# Patient Record
Sex: MALE | Race: WHITE | NOT HISPANIC OR LATINO | Employment: FULL TIME | URBAN - METROPOLITAN AREA
[De-identification: names, ages, dates, MRNs, and addresses within clinical notes are randomized per-mention and may not be internally consistent; named-entity substitution may affect disease eponyms.]

---

## 2017-05-05 ENCOUNTER — ALLSCRIPTS OFFICE VISIT (OUTPATIENT)
Dept: OTHER | Facility: OTHER | Age: 37
End: 2017-05-05

## 2017-06-14 ENCOUNTER — TRANSCRIBE ORDERS (OUTPATIENT)
Dept: ADMINISTRATIVE | Age: 37
End: 2017-06-14

## 2017-06-14 ENCOUNTER — APPOINTMENT (OUTPATIENT)
Dept: RADIOLOGY | Age: 37
End: 2017-06-14
Attending: PREVENTIVE MEDICINE

## 2017-06-14 ENCOUNTER — APPOINTMENT (OUTPATIENT)
Dept: LAB | Age: 37
End: 2017-06-14
Attending: PREVENTIVE MEDICINE

## 2017-06-14 DIAGNOSIS — Z02.1 PRE-EMPLOYMENT HEALTH SCREENING EXAMINATION: ICD-10-CM

## 2017-06-14 DIAGNOSIS — Z02.1 PRE-EMPLOYMENT HEALTH SCREENING EXAMINATION: Primary | ICD-10-CM

## 2017-06-14 LAB
ERYTHROCYTE [DISTWIDTH] IN BLOOD BY AUTOMATED COUNT: 12.3 % (ref 11.6–15.1)
HCT VFR BLD AUTO: 40.5 % (ref 36.5–49.3)
HGB BLD-MCNC: 13.6 G/DL (ref 12–17)
MCH RBC QN AUTO: 31.8 PG (ref 26.8–34.3)
MCHC RBC AUTO-ENTMCNC: 33.6 G/DL (ref 31.4–37.4)
MCV RBC AUTO: 95 FL (ref 82–98)
PLATELET # BLD AUTO: 238 THOUSANDS/UL (ref 149–390)
PMV BLD AUTO: 10.6 FL (ref 8.9–12.7)
RBC # BLD AUTO: 4.28 MILLION/UL (ref 3.88–5.62)
WBC # BLD AUTO: 4.33 THOUSAND/UL (ref 4.31–10.16)

## 2017-06-14 PROCEDURE — 71010 HB CHEST X-RAY 1 VIEW FRONTAL: CPT

## 2017-06-14 PROCEDURE — 85027 COMPLETE CBC AUTOMATED: CPT

## 2017-06-14 PROCEDURE — 36415 COLL VENOUS BLD VENIPUNCTURE: CPT

## 2018-01-13 VITALS
SYSTOLIC BLOOD PRESSURE: 106 MMHG | HEART RATE: 72 BPM | DIASTOLIC BLOOD PRESSURE: 74 MMHG | RESPIRATION RATE: 16 BRPM | HEIGHT: 73 IN | BODY MASS INDEX: 27.04 KG/M2 | WEIGHT: 204 LBS | TEMPERATURE: 95.7 F

## 2018-02-27 ENCOUNTER — TELEPHONE (OUTPATIENT)
Dept: FAMILY MEDICINE CLINIC | Facility: CLINIC | Age: 38
End: 2018-02-27

## 2018-02-28 ENCOUNTER — OFFICE VISIT (OUTPATIENT)
Dept: FAMILY MEDICINE CLINIC | Facility: CLINIC | Age: 38
End: 2018-02-28
Payer: COMMERCIAL

## 2018-02-28 VITALS
WEIGHT: 195.8 LBS | DIASTOLIC BLOOD PRESSURE: 68 MMHG | TEMPERATURE: 97.4 F | BODY MASS INDEX: 25.95 KG/M2 | SYSTOLIC BLOOD PRESSURE: 108 MMHG | HEART RATE: 74 BPM | HEIGHT: 73 IN | RESPIRATION RATE: 18 BRPM

## 2018-02-28 DIAGNOSIS — G56.23 CUBITAL TUNNEL SYNDROME, BILATERAL: ICD-10-CM

## 2018-02-28 DIAGNOSIS — F90.0 ATTENTION DEFICIT HYPERACTIVITY DISORDER (ADHD), PREDOMINANTLY INATTENTIVE TYPE: ICD-10-CM

## 2018-02-28 DIAGNOSIS — A60.00 RECURRENT GENITAL HERPES: ICD-10-CM

## 2018-02-28 DIAGNOSIS — M77.01 MEDIAL EPICONDYLITIS OF RIGHT ELBOW: ICD-10-CM

## 2018-02-28 DIAGNOSIS — M77.11 RIGHT LATERAL EPICONDYLITIS: Primary | ICD-10-CM

## 2018-02-28 PROCEDURE — 99213 OFFICE O/P EST LOW 20 MIN: CPT | Performed by: FAMILY MEDICINE

## 2018-02-28 RX ORDER — VALACYCLOVIR HYDROCHLORIDE 1 G/1
TABLET, FILM COATED ORAL
Qty: 20 TABLET | Refills: 5 | Status: SHIPPED | OUTPATIENT
Start: 2018-02-28 | End: 2018-05-21 | Stop reason: SDUPTHER

## 2018-02-28 RX ORDER — METHYLPHENIDATE HYDROCHLORIDE 36 MG/1
36 TABLET ORAL EVERY MORNING
Qty: 30 TABLET | Refills: 0 | Status: SHIPPED | OUTPATIENT
Start: 2018-02-28 | End: 2018-05-21 | Stop reason: SDUPTHER

## 2018-02-28 RX ORDER — NAPROXEN 500 MG/1
500 TABLET ORAL 2 TIMES DAILY WITH MEALS
Qty: 60 TABLET | Refills: 1 | Status: SHIPPED | OUTPATIENT
Start: 2018-02-28 | End: 2022-01-28 | Stop reason: ALTCHOICE

## 2018-02-28 RX ORDER — METHYLPHENIDATE HYDROCHLORIDE 36 MG/1
1 TABLET, EXTENDED RELEASE ORAL DAILY
COMMUNITY
Start: 2011-04-04 | End: 2018-02-28 | Stop reason: SDUPTHER

## 2018-02-28 RX ORDER — VALACYCLOVIR HYDROCHLORIDE 1 G/1
TABLET, FILM COATED ORAL
COMMUNITY
Start: 2012-07-10 | End: 2018-02-28 | Stop reason: SDUPTHER

## 2018-02-28 NOTE — PROGRESS NOTES
Assessment/Plan:     Diagnoses and all orders for this visit:    Right lateral epicondylitis  Comments:  Reviewed: REC; rest, ice, brace  Avoid exacerbating positions/activities  Naproxen as directed  Recheck 2 weeks if no change  Orders:  -     naproxen (NAPROSYN) 500 mg tablet; Take 1 tablet (500 mg total) by mouth 2 (two) times a day with meals    Medial epicondylitis of right elbow  Comments:  as above  Orders:  -     naproxen (NAPROSYN) 500 mg tablet; Take 1 tablet (500 mg total) by mouth 2 (two) times a day with meals    Cubital tunnel syndrome, bilateral  Comments:  as above  Orders:  -     naproxen (NAPROSYN) 500 mg tablet; Take 1 tablet (500 mg total) by mouth 2 (two) times a day with meals    Attention deficit hyperactivity disorder (ADHD), predominantly inattentive type  Comments:  refilled meds  Orders:  -     methylphenidate (CONCERTA) 36 MG ER tablet; Take 1 tablet (36 mg total) by mouth every morning Max Daily Amount: 36 mg    Recurrent genital herpes  Comments:  refilled meds  Orders:  -     valACYclovir (VALTREX) 1,000 mg tablet; 1 tab po bid x 10 days    Other orders  -     Discontinue: Methylphenidate HCl ER 36 MG TB24; Take 1 tablet by mouth daily  -     Discontinue: valACYclovir (VALTREX) 1,000 mg tablet; Take by mouth          Subjective:      Patient ID: Terrence Griggs is a 40 y o  male  Pt with 2 week hx of worsening R>> L elbow pain  Pt notes some intermittent pain worse with certain positions and activities that had been going on for a few months but now has worsening pain that does not seem to be resolving  Pain Is over the medial and lateral epicondyles and may radiate to forearms  Can get numbness in the R 4th/5th fingers and lateral hand  No weakness  No neck pain or shoulder pain        The following portions of the patient's history were reviewed and updated as appropriate:   He  has no past medical history on file    He   Patient Active Problem List    Diagnosis Date Noted    Severe carpal tunnel syndrome, left 09/26/2016    Bilateral carpal tunnel syndrome 12/28/2015    Attention deficit hyperactivity disorder (ADHD) 09/24/2012     He  has a past surgical history that includes pr revise median n/carpal tunnel surg (Left, 10/25/2016) and pr revise median n/carpal tunnel surg (Right, 3/22/2016)  He  reports that he has quit smoking  He does not have any smokeless tobacco history on file  He reports that he drinks about 1 8 oz of alcohol per week   He reports that he does not use drugs  Current Outpatient Prescriptions   Medication Sig Dispense Refill    methylphenidate (CONCERTA) 36 MG ER tablet Take 1 tablet (36 mg total) by mouth every morning Max Daily Amount: 36 mg 30 tablet 0    valACYclovir (VALTREX) 1,000 mg tablet 1 tab po bid x 10 days 20 tablet 5    naproxen (NAPROSYN) 500 mg tablet Take 1 tablet (500 mg total) by mouth 2 (two) times a day with meals 60 tablet 1     No current facility-administered medications for this visit  He has No Known Allergies       Review of Systems   Musculoskeletal: Positive for arthralgias and myalgias  Negative for back pain, gait problem, joint swelling, neck pain and neck stiffness  Skin: Negative  Neurological: Negative for tremors, weakness and numbness  Objective:      /68   Pulse 74   Temp (!) 97 4 °F (36 3 °C)   Resp 18   Ht 6' 1" (1 854 m)   Wt 88 8 kg (195 lb 12 8 oz)   BMI 25 83 kg/m²          Physical Exam   Constitutional: He appears well-developed and well-nourished  Neck: Normal range of motion  Neck supple  Cardiovascular: Normal rate and intact distal pulses      Musculoskeletal:        Arms:

## 2018-03-01 ENCOUNTER — TELEPHONE (OUTPATIENT)
Dept: FAMILY MEDICINE CLINIC | Facility: CLINIC | Age: 38
End: 2018-03-01

## 2018-03-01 NOTE — LETTER
March 1, 2018     Patient: Kelsi Pappas   YOB: 1980   Date of Visit: 02/28/2018       To Whom It May Concern: It is my medical opinion that Kelsi Pappas may return to light duty immediately with the following restrictions: to avoid repetitive motion and lifting > 10 lbs with Right arm for 2 weeks       If you have any questions or concerns, please don't hesitate to call           Sincerely,        Mejia Cutler MD    CC: No Recipients

## 2018-03-01 NOTE — TELEPHONE ENCOUNTER
WAS SEEN YESTERDAY FOR HIS ELBOW     NEEDS A NOTE FOR WORK   FOR LIGHT DUTY FOR A FEW WEEKS     OR THAT HE HAS TO TAKE IT EASY          PLEASE CALL WHEN DONE

## 2018-03-30 ENCOUNTER — OFFICE VISIT (OUTPATIENT)
Dept: FAMILY MEDICINE CLINIC | Facility: CLINIC | Age: 38
End: 2018-03-30
Payer: COMMERCIAL

## 2018-03-30 VITALS
SYSTOLIC BLOOD PRESSURE: 118 MMHG | HEIGHT: 73 IN | TEMPERATURE: 97.4 F | DIASTOLIC BLOOD PRESSURE: 78 MMHG | HEART RATE: 74 BPM | BODY MASS INDEX: 26.56 KG/M2 | WEIGHT: 200.4 LBS

## 2018-03-30 DIAGNOSIS — J01.00 ACUTE NON-RECURRENT MAXILLARY SINUSITIS: Primary | ICD-10-CM

## 2018-03-30 DIAGNOSIS — J20.9 ACUTE BRONCHITIS, UNSPECIFIED ORGANISM: ICD-10-CM

## 2018-03-30 PROCEDURE — 99213 OFFICE O/P EST LOW 20 MIN: CPT | Performed by: FAMILY MEDICINE

## 2018-03-30 RX ORDER — AMOXICILLIN AND CLAVULANATE POTASSIUM 875; 125 MG/1; MG/1
TABLET, FILM COATED ORAL
Qty: 14 TABLET | Refills: 0 | Status: SHIPPED | OUTPATIENT
Start: 2018-03-30 | End: 2018-04-05

## 2018-03-30 RX ORDER — AMOXICILLIN AND CLAVULANATE POTASSIUM 875; 125 MG/1; MG/1
TABLET, FILM COATED ORAL
Refills: 0 | COMMUNITY
Start: 2018-03-25 | End: 2018-03-30 | Stop reason: SDUPTHER

## 2018-03-30 NOTE — PROGRESS NOTES
Assessment/Plan:     Diagnoses and all orders for this visit:    Acute non-recurrent maxillary sinusitis  Comments:  Pt may be slowly improving  Cont Augmentin and flonase  Recheck Tues if not resolved  Orders:  -     amoxicillin-clavulanate (AUGMENTIN) 875-125 mg per tablet; 1 tab bid x 7d    Acute bronchitis, unspecified organism  Comments:  Trial of Symbicort 2 puff bid  Recheck Tues if not improved  Subjective:      Patient ID: Batsheva Kilpatrick is a 40 y o  male  One-week history of worsening sinus pressure along with your symptoms  Patient was seen Sunday at [facility] diagnosed with sinusitis, and started on Augmentin  Patient was feeling worse with productive cough on Monday through Wednesday  Yesterday patient started feeling a little bit better but did not resolve  Patient is here for recheck  Patient denies any fever, chills or shortness of breath  Patient still has sinus pressure in the ethmoid area  The following portions of the patient's history were reviewed and updated as appropriate:   He  has no past medical history on file  He   Patient Active Problem List    Diagnosis Date Noted    Severe carpal tunnel syndrome, left 09/26/2016    Bilateral carpal tunnel syndrome 12/28/2015    Attention deficit hyperactivity disorder (ADHD) 09/24/2012     He  reports that he has quit smoking  He does not have any smokeless tobacco history on file  He reports that he drinks about 1 8 oz of alcohol per week   He reports that he does not use drugs    Current Outpatient Prescriptions   Medication Sig Dispense Refill    amoxicillin-clavulanate (AUGMENTIN) 875-125 mg per tablet 1 tab bid x 7d 14 tablet 0    methylphenidate (CONCERTA) 36 MG ER tablet Take 1 tablet (36 mg total) by mouth every morning Max Daily Amount: 36 mg 30 tablet 0    naproxen (NAPROSYN) 500 mg tablet Take 1 tablet (500 mg total) by mouth 2 (two) times a day with meals 60 tablet 1    valACYclovir (VALTREX) 1,000 mg tablet 1 tab po bid x 10 days 20 tablet 5     No current facility-administered medications for this visit  He has No Known Allergies       Review of Systems   Constitutional: Positive for fatigue  Negative for activity change, appetite change, chills and fever  HENT: Positive for congestion, postnasal drip, sinus pain and sinus pressure  Negative for drooling, ear discharge, ear pain, facial swelling, nosebleeds and sore throat  Eyes: Negative for pain, discharge, redness and itching  Respiratory: Positive for cough  Negative for apnea, chest tightness and shortness of breath  Cardiovascular: Negative for chest pain and leg swelling  Musculoskeletal: Negative for arthralgias and myalgias  Objective:      /78   Pulse 74   Temp (!) 97 4 °F (36 3 °C)   Ht 6' 1" (1 854 m)   Wt 90 9 kg (200 lb 6 4 oz)   BMI 26 44 kg/m²          Physical Exam   Constitutional: Vital signs are normal  He appears well-developed  He appears toxic  He appears ill  No distress  HENT:   Head: Normocephalic and atraumatic  Right Ear: External ear normal    Left Ear: External ear normal    Nose: Mucosal edema and rhinorrhea present  No epistaxis  Right sinus exhibits maxillary sinus tenderness  Right sinus exhibits no frontal sinus tenderness  Left sinus exhibits maxillary sinus tenderness  Left sinus exhibits no frontal sinus tenderness  Mouth/Throat: Oropharynx is clear and moist    Eyes: Conjunctivae and EOM are normal  Pupils are equal, round, and reactive to light  Neck: Normal range of motion  No thyromegaly present  Cardiovascular: Normal rate, regular rhythm and normal heart sounds  Exam reveals no friction rub  No murmur heard  Pulmonary/Chest: Effort normal  No respiratory distress  He has no wheezes  He has no rales  Deep, bronchitic cough   Lymphadenopathy:     He has no cervical adenopathy  Neurological: He is alert  Skin: Skin is warm

## 2018-05-21 DIAGNOSIS — A60.00 RECURRENT GENITAL HERPES: ICD-10-CM

## 2018-05-21 DIAGNOSIS — F90.0 ATTENTION DEFICIT HYPERACTIVITY DISORDER (ADHD), PREDOMINANTLY INATTENTIVE TYPE: ICD-10-CM

## 2018-05-21 RX ORDER — VALACYCLOVIR HYDROCHLORIDE 1 G/1
TABLET, FILM COATED ORAL
Qty: 20 TABLET | Refills: 0 | Status: SHIPPED | OUTPATIENT
Start: 2018-05-21 | End: 2018-11-09 | Stop reason: SDUPTHER

## 2018-05-21 RX ORDER — METHYLPHENIDATE HYDROCHLORIDE 36 MG/1
36 TABLET ORAL EVERY MORNING
Qty: 30 TABLET | Refills: 0 | Status: SHIPPED | OUTPATIENT
Start: 2018-05-21 | End: 2018-07-24 | Stop reason: SDUPTHER

## 2018-06-04 ENCOUNTER — OFFICE VISIT (OUTPATIENT)
Dept: FAMILY MEDICINE CLINIC | Facility: CLINIC | Age: 38
End: 2018-06-04
Payer: COMMERCIAL

## 2018-06-04 VITALS
TEMPERATURE: 97.6 F | HEART RATE: 70 BPM | HEIGHT: 73 IN | WEIGHT: 195.2 LBS | DIASTOLIC BLOOD PRESSURE: 80 MMHG | SYSTOLIC BLOOD PRESSURE: 122 MMHG | BODY MASS INDEX: 25.87 KG/M2

## 2018-06-04 DIAGNOSIS — B07.8 COMMON WART: Primary | ICD-10-CM

## 2018-06-04 PROCEDURE — 17110 DESTRUCTION B9 LES UP TO 14: CPT | Performed by: NURSE PRACTITIONER

## 2018-06-04 PROCEDURE — 99212 OFFICE O/P EST SF 10 MIN: CPT | Performed by: NURSE PRACTITIONER

## 2018-06-04 NOTE — PROGRESS NOTES
Patient ID: En Damon is a 40 y o  male  HPI: 40 y o male presenting with right ring and middle finger warts that he wants removed  The wart on the right ring finger located on posterior surface and the right middle finger wart located on anterior surface  SUBJECTIVE    No family history on file  Social History     Social History    Marital status: Single     Spouse name: N/A    Number of children: N/A    Years of education: N/A     Occupational History    Not on file  Social History Main Topics    Smoking status: Former Smoker    Smokeless tobacco: Not on file    Alcohol use 1 8 oz/week     3 Cans of beer per week      Comment: every other day    Drug use: No    Sexual activity: Not on file     Other Topics Concern    Not on file     Social History Narrative    No narrative on file     No past medical history on file    Past Surgical History:   Procedure Laterality Date    VA REVISE MEDIAN N/CARPAL TUNNEL SURG Left 10/25/2016    Procedure: CARPAL TUNNEL RELEASE ;  Surgeon: Falguni Lebron DO;  [facility];  Service: Orthopedics    VA REVISE MEDIAN N/CARPAL TUNNEL SURG Right 3/22/2016    Procedure: CARPAL TUNNEL RELEASE ;  Surgeon: Falguni Lebron DO;  [facility];  Service: Orthopedics     No Known Allergies    Current Outpatient Prescriptions:     methylphenidate (CONCERTA) 36 MG ER tablet, Take 1 tablet (36 mg total) by mouth every morning Max Daily Amount: 36 mg, Disp: 30 tablet, Rfl: 0    naproxen (NAPROSYN) 500 mg tablet, Take 1 tablet (500 mg total) by mouth 2 (two) times a day with meals, Disp: 60 tablet, Rfl: 1    valACYclovir (VALTREX) 1,000 mg tablet, 1 tab po bid x 10 days, Disp: 20 tablet, Rfl: 0    Review of Systems    Consitutional:  Denies chills and fever   Pulmonary:  Denies shortness of breath or dyspnea on exertion    Cardiovascular:  Denies chest pain/pressure   Musculoskeletal:  Denies myalgia or muscle weakness    Integumentary:  Wart on right finger and middle fingers   Neurological:  Denies headaches, dizziness, confusion, loss of consciousness or behavioral changes  Psychological:  Denies anxiety, depression or sleep disturbances      OBJECTIVE    /80   Pulse 70   Temp 97 6 °F (36 4 °C)   Ht 6' 1" (1 854 m)   Wt 88 5 kg (195 lb 3 2 oz)   BMI 25 75 kg/m²     Constitutional:  Well appearing and in no acute distress  Pulmonary:  clear to auscultation bilaterally and no crackles, no wheezes, chest expansion normal  Cardiovascular:  S1S2, regular rate and rhythm  Lymphatic:  no lymphadenopathy   Musculoskeletal:  no muscular tenderness noted  Skin:  slightly raised, smooth, skin-colored lesion on anterior surface of right middle finger and on right posterior surface of ring finger  Neurologic:  Alert and oriented x 4 and Affect and mood normal    Lesion Destruction  Date/Time: 6/4/2018 10:40 AM  Performed by: Camelia CARRERA  Authorized by: Camelia CARRERA     Procedure Details - Lesion Destruction:     Number of Lesions:  2  Lesion 1:     Body area:  Upper extremity    Upper extremity location:  R long finger    Initial size (mm):  2    Final defect size (mm):  2    Malignancy: benign lesion      Destruction method: cryotherapy    Lesion 2:     Body area:  Upper extremity    Upper extremity location:  R ring finger    Initial size (mm):  2    Final defect size (mm):  2    Malignancy: benign lesion      Destruction method: cryotherapy      2    Assessment/Plan:  Diagnoses and all orders for this visit:    Common wart      Common wart  Reviewed with patient plan to use cryotherapy to remove and discussed the potential need to return for more cryotherapy for complete removal  Patient instructed to call in 72 hours if not feeling better or if symptoms worsen

## 2018-06-26 ENCOUNTER — OFFICE VISIT (OUTPATIENT)
Dept: FAMILY MEDICINE CLINIC | Facility: CLINIC | Age: 38
End: 2018-06-26
Payer: COMMERCIAL

## 2018-06-26 VITALS
BODY MASS INDEX: 26.21 KG/M2 | DIASTOLIC BLOOD PRESSURE: 70 MMHG | SYSTOLIC BLOOD PRESSURE: 126 MMHG | TEMPERATURE: 97.8 F | HEART RATE: 68 BPM | WEIGHT: 197.8 LBS | HEIGHT: 73 IN

## 2018-06-26 DIAGNOSIS — B07.8 COMMON WART: ICD-10-CM

## 2018-06-26 DIAGNOSIS — M25.561 ACUTE PAIN OF RIGHT KNEE: Primary | ICD-10-CM

## 2018-06-26 PROCEDURE — 99213 OFFICE O/P EST LOW 20 MIN: CPT | Performed by: NURSE PRACTITIONER

## 2018-06-26 PROCEDURE — 17110 DESTRUCTION B9 LES UP TO 14: CPT | Performed by: NURSE PRACTITIONER

## 2018-06-26 PROCEDURE — 3008F BODY MASS INDEX DOCD: CPT | Performed by: NURSE PRACTITIONER

## 2018-06-26 NOTE — PROGRESS NOTES
Patient ID: Quyen Bailon is a 40 y o  male  HPI: 40 y o male presenting with right knee swelling and pain that started last week after a long bike ride  He noticed a swollen lump on the outer aspect of his right leg that as improved but not completely resolved  Patient also is presenting with right ringer finger and middle finger warts that have not resolved since having cryotherapy performed on both in the office on 06/04/18  SUBJECTIVE    No family history on file  Social History     Social History    Marital status: Single     Spouse name: N/A    Number of children: N/A    Years of education: N/A     Occupational History    Not on file  Social History Main Topics    Smoking status: Former Smoker    Smokeless tobacco: Not on file    Alcohol use 1 8 oz/week     3 Cans of beer per week      Comment: every other day    Drug use: No    Sexual activity: Not on file     Other Topics Concern    Not on file     Social History Narrative    No narrative on file     No past medical history on file    Past Surgical History:   Procedure Laterality Date    IL REVISE MEDIAN N/CARPAL TUNNEL SURG Left 10/25/2016    Procedure: CARPAL TUNNEL RELEASE ;  Surgeon: Jeni Peacock DO;  [facility];  Service: Orthopedics    IL REVISE MEDIAN N/CARPAL TUNNEL SURG Right 3/22/2016    Procedure: CARPAL TUNNEL RELEASE ;  Surgeon: Jeni Peacock DO;  [facility];  Service: Orthopedics     No Known Allergies    Current Outpatient Prescriptions:     methylphenidate (CONCERTA) 36 MG ER tablet, Take 1 tablet (36 mg total) by mouth every morning Max Daily Amount: 36 mg, Disp: 30 tablet, Rfl: 0    naproxen (NAPROSYN) 500 mg tablet, Take 1 tablet (500 mg total) by mouth 2 (two) times a day with meals, Disp: 60 tablet, Rfl: 1    valACYclovir (VALTREX) 1,000 mg tablet, 1 tab po bid x 10 days, Disp: 20 tablet, Rfl: 0    Review of Systems    Consitutional:  Denies chills, fatigue and fever   Pulmonary:  Denies cough, shortness of breath or dyspnea on exertion    Cardiovascular:  Denies chest pain/pressure   Musculoskeletal:  Positive for swelling in right knee after a long bicycle ride  Denies sensation of pain or  instability ("giving out") or locking in position  Denies gait disturbance, arthalgia or muscle weakness  Integumentary:  skin lesion(s) fingers - warts located on right index and middle fingers  Neurological:  Denies headaches, dizziness, confusion, loss of consciousness or behavioral changes  Psychological:  Denies anxiety, depression or sleep disturbances      OBJECTIVE    /70   Pulse 68   Temp 97 8 °F (36 6 °C)   Ht 6' 1" (1 854 m)   Wt 89 7 kg (197 lb 12 8 oz)   BMI 26 10 kg/m²     Constitutional:  Well appearing and in no acute distress  ENT:     Pulmonary:  clear to auscultation bilaterally and no crackles, no wheezes, chest expansion normal  Cardiovascular:  S1S2, regular rate and rhythm  Musculoskeletal:  Positive joint line tenderness on palpation of lateral collateral ligament; no effusion or erythema; ACL stable; PCL stable; MCL stable; LCL stable; no patellar laxity; no crepitus; full active range of motion without pain; Negative Ramesh and Lachman test  Skin:   Skin color, texture and turgor normal with exception of right posterior ring finger and right anterior middle finger have small round elevated lesion with center black dot - common warts that received cryotherapy on 06/04/18 with slight reduction in size    Neurologic:  Alert and oriented x 4 and Affect and mood normal    Lesion Destruction  Date/Time: 6/26/2018 6:59 PM  Performed by: Mayuri Campuzano  Authorized by: Mayuri Campuzano     Procedure Details - Lesion Destruction:     Number of Lesions:  2  Lesion 1:     Body area:  Upper extremity    Upper extremity location:  R long finger    Initial size (mm):  1    Final defect size (mm):  1    Malignancy: benign lesion      Destruction method: cryotherapy    Lesion 2:     Body area:  Upper extremity    Upper extremity location:  R ring finger    Initial size (mm):  1    Final defect size (mm):  1    Malignancy: benign lesion      Destruction method: cryotherapy          Assessment/Plan:  Diagnoses and all orders for this visit:    Acute pain of right knee  -     XR knee 3 vw right non injury; Future    Common wart      #1 Acute pain of right knee  Reviewed with patient plan to obtain xray of right knee  Discussed with patient to use conservative measures to decrease inflammation: ice; rest and compression  #2 Common wart  Patient instructed to soak the wart in warm water for 5 minutes tomorrow   Gently scrape the wart with a pumice stone or nail file to remove dead skin and repeat as needed to remove wart  Patient instructed to call in 72 hours if not feeling better or if symptoms worsen

## 2018-06-28 ENCOUNTER — APPOINTMENT (OUTPATIENT)
Dept: RADIOLOGY | Facility: MEDICAL CENTER | Age: 38
End: 2018-06-28
Payer: COMMERCIAL

## 2018-06-28 DIAGNOSIS — M25.561 ACUTE PAIN OF RIGHT KNEE: ICD-10-CM

## 2018-06-28 PROCEDURE — 73562 X-RAY EXAM OF KNEE 3: CPT

## 2018-07-24 DIAGNOSIS — F90.0 ATTENTION DEFICIT HYPERACTIVITY DISORDER (ADHD), PREDOMINANTLY INATTENTIVE TYPE: ICD-10-CM

## 2018-07-24 RX ORDER — METHYLPHENIDATE HYDROCHLORIDE 36 MG/1
36 TABLET ORAL EVERY MORNING
Qty: 30 TABLET | Refills: 0 | Status: SHIPPED | OUTPATIENT
Start: 2018-07-24 | End: 2018-09-11 | Stop reason: SDUPTHER

## 2018-09-11 DIAGNOSIS — F90.0 ATTENTION DEFICIT HYPERACTIVITY DISORDER (ADHD), PREDOMINANTLY INATTENTIVE TYPE: ICD-10-CM

## 2018-09-11 RX ORDER — METHYLPHENIDATE HYDROCHLORIDE 36 MG/1
36 TABLET ORAL EVERY MORNING
Qty: 30 TABLET | Refills: 0 | Status: SHIPPED | OUTPATIENT
Start: 2018-09-11 | End: 2018-11-09 | Stop reason: SDUPTHER

## 2018-11-09 DIAGNOSIS — F90.0 ATTENTION DEFICIT HYPERACTIVITY DISORDER (ADHD), PREDOMINANTLY INATTENTIVE TYPE: ICD-10-CM

## 2018-11-09 DIAGNOSIS — A60.00 RECURRENT GENITAL HERPES: ICD-10-CM

## 2018-11-09 RX ORDER — VALACYCLOVIR HYDROCHLORIDE 1 G/1
TABLET, FILM COATED ORAL
Qty: 20 TABLET | Refills: 0 | Status: SHIPPED | OUTPATIENT
Start: 2018-11-09 | End: 2019-01-24 | Stop reason: ALTCHOICE

## 2018-11-09 RX ORDER — METHYLPHENIDATE HYDROCHLORIDE 36 MG/1
36 TABLET ORAL EVERY MORNING
Qty: 30 TABLET | Refills: 0 | Status: SHIPPED | OUTPATIENT
Start: 2018-11-09 | End: 2019-01-16 | Stop reason: SDUPTHER

## 2018-12-10 ENCOUNTER — TELEPHONE (OUTPATIENT)
Dept: FAMILY MEDICINE CLINIC | Facility: CLINIC | Age: 38
End: 2018-12-10

## 2018-12-10 NOTE — TELEPHONE ENCOUNTER
Got his 2nd DUI on Friday     He believes he has a drinking problem he has a drinking problem and would like to get some help,     He's not sure how to do this, does he need to come in to see you or can you get the ball started for him?/     Please advise

## 2018-12-11 ENCOUNTER — OFFICE VISIT (OUTPATIENT)
Dept: FAMILY MEDICINE CLINIC | Facility: CLINIC | Age: 38
End: 2018-12-11
Payer: COMMERCIAL

## 2018-12-11 VITALS
HEIGHT: 73 IN | BODY MASS INDEX: 26.08 KG/M2 | DIASTOLIC BLOOD PRESSURE: 80 MMHG | SYSTOLIC BLOOD PRESSURE: 110 MMHG | WEIGHT: 196.8 LBS | HEART RATE: 70 BPM | TEMPERATURE: 97.6 F

## 2018-12-11 DIAGNOSIS — F10.20 UNCOMPLICATED ALCOHOL DEPENDENCE (HCC): ICD-10-CM

## 2018-12-11 DIAGNOSIS — F90.0 ATTENTION DEFICIT HYPERACTIVITY DISORDER (ADHD), PREDOMINANTLY INATTENTIVE TYPE: Primary | ICD-10-CM

## 2018-12-11 DIAGNOSIS — F32.0 CURRENT MILD EPISODE OF MAJOR DEPRESSIVE DISORDER WITHOUT PRIOR EPISODE (HCC): ICD-10-CM

## 2018-12-11 PROCEDURE — 99214 OFFICE O/P EST MOD 30 MIN: CPT | Performed by: FAMILY MEDICINE

## 2018-12-11 PROCEDURE — 3008F BODY MASS INDEX DOCD: CPT | Performed by: FAMILY MEDICINE

## 2018-12-11 NOTE — PROGRESS NOTES
Assessment/Plan:    Attention deficit hyperactivity disorder (ADHD)  Appears to be stable  Depression and EtOH use addressed  Recheck 4 weeks    Current mild episode of major depressive disorder without prior episode (HCC)   EtOH use appears to be, is part, related to self medications  Pt does not want medications or other therapy at present  Will refer for alcohol treatment  Check labs  Recheck 4 weeks    Uncomplicated alcohol dependence (HCC)  Self medication? I am concerned re: 2 DUIs / We discussed  Pt has been dry for 3 days and does not have any signs of withdrawal  Will speak with [facility] re: available outpatient treatment programs  Recheck 3-4 weeks       Diagnoses and all orders for this visit:    Attention deficit hyperactivity disorder (ADHD), predominantly inattentive type    Current mild episode of major depressive disorder without prior episode ([facility] [address] )  -     Comprehensive metabolic panel; Future  -     TSH, 3rd generation with Free T4 reflex; Future  -     CBC and differential; Future    Uncomplicated alcohol dependence (HCC)  -     Comprehensive metabolic panel; Future  -     TSH, 3rd generation with Free T4 reflex; Future  -     CBC and differential; Future          Subjective:      Patient ID: Rosangela Reeves is a 45 y o  male  - pt recently had his second DUI (last Friday)  Pt has been seeing a counselor who feels that he may have a problem  Pt admits to drinking 3-4 beers a day though he states that he really gets trashed  Dui occurred after 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 and then drove home  Patient thinks he might be able to stop drinking, only gets annoyed when talking about his drinking with his parents, does feel little guilty about his present drinking but does not typically have any eye openers  He admits to occasionally feeling depressed and occasionally has some anhedonia which she attributes to stress  Patient is occasionally anxious at times    He thinks he drinks at night to calmed down  Sleep can be labile  He denies any other substance abuse  - patient denies any cardiovascular, respiratory, GI or  complaints   - PHQ done      Alcohol Problem   Pertinent negatives include no agitation  The following portions of the patient's history were reviewed and updated as appropriate:   He  has a past medical history of Bilateral carpal tunnel syndrome  He   Patient Active Problem List    Diagnosis Date Noted    Current mild episode of major depressive disorder without prior episode ([facility] [address] ) 21/85/1969    Uncomplicated alcohol dependence ([facility] [address] ) 12/11/2018    Severe carpal tunnel syndrome, left 09/26/2016    Bilateral carpal tunnel syndrome 12/28/2015    Attention deficit hyperactivity disorder (ADHD) 09/24/2012     He  has a past surgical history that includes pr revise median n/carpal tunnel surg (Left, 10/25/2016); pr revise median n/carpal tunnel surg (Right, 3/22/2016); and Vasectomy  He  reports that he has quit smoking  He does not have any smokeless tobacco history on file  He reports that he drinks about 1 8 oz of alcohol per week   He reports that he does not use drugs  Current Outpatient Prescriptions   Medication Sig Dispense Refill    methylphenidate (CONCERTA) 36 MG ER tablet Take 1 tablet (36 mg total) by mouth every morning Max Daily Amount: 36 mg 30 tablet 0    naproxen (NAPROSYN) 500 mg tablet Take 1 tablet (500 mg total) by mouth 2 (two) times a day with meals 60 tablet 1    valACYclovir (VALTREX) 1,000 mg tablet 1 tab po bid x 10 days 20 tablet 0     No current facility-administered medications for this visit  He has No Known Allergies       Review of Systems   Constitutional: Negative  HENT: Negative  Eyes: Negative  Respiratory: Negative  Cardiovascular: Negative  Gastrointestinal: Negative  Genitourinary: Negative  Musculoskeletal: Negative  Allergic/Immunologic: Negative      Hematological: Negative  Psychiatric/Behavioral: Positive for dysphoric mood and sleep disturbance  Negative for agitation and suicidal ideas  The patient is nervous/anxious  Objective:      /80   Pulse 70   Temp 97 6 °F (36 4 °C)   Ht 6' 1" (1 854 m)   Wt 89 3 kg (196 lb 12 8 oz)   BMI 25 96 kg/m²          Physical Exam   Constitutional: He is oriented to person, place, and time  He appears well-developed and well-nourished  HENT:   Head: Normocephalic and atraumatic  Mouth/Throat: Oropharynx is clear and moist    Eyes: Pupils are equal, round, and reactive to light  Conjunctivae and EOM are normal    Neck: Normal range of motion  No thyromegaly present  Cardiovascular: Normal rate, regular rhythm and intact distal pulses  Pulmonary/Chest: Effort normal and breath sounds normal    Abdominal: Soft  Bowel sounds are normal  He exhibits no distension and no mass  There is no tenderness  No HSM   Lymphadenopathy:     He has no cervical adenopathy  Neurological: He is alert and oriented to person, place, and time  No cranial nerve deficit  Skin: Skin is warm     Psychiatric: His behavior is normal  Judgment and thought content normal    PHQ-9 = 6 (mild depression)

## 2018-12-11 NOTE — LETTER
Magui Fung,     This is the patient we spoke about this morning  Please reach out to him re: available out patient EtOH programs   Thanks    [facility]

## 2018-12-12 ENCOUNTER — TELEPHONE (OUTPATIENT)
Dept: FAMILY MEDICINE CLINIC | Facility: CLINIC | Age: 38
End: 2018-12-12

## 2018-12-12 PROBLEM — F32.0 CURRENT MILD EPISODE OF MAJOR DEPRESSIVE DISORDER WITHOUT PRIOR EPISODE (HCC): Status: ACTIVE | Noted: 2018-12-12

## 2018-12-12 NOTE — ASSESSMENT & PLAN NOTE
EtOH use appears to be, is part, related to self medications  Pt does not want medications or other therapy at present  Will refer for alcohol treatment  Check labs   Recheck 4 weeks

## 2018-12-12 NOTE — ASSESSMENT & PLAN NOTE
Self medication? I am concerned re: 2 DUIs / We discussed  Pt has been dry for 3 days and does not have any signs of withdrawal  Will speak with [facility] re: available outpatient treatment programs   Recheck 3-4 weeks

## 2018-12-13 ENCOUNTER — APPOINTMENT (OUTPATIENT)
Dept: LAB | Facility: MEDICAL CENTER | Age: 38
End: 2018-12-13
Payer: COMMERCIAL

## 2018-12-13 DIAGNOSIS — F10.20 UNCOMPLICATED ALCOHOL DEPENDENCE (HCC): ICD-10-CM

## 2018-12-13 DIAGNOSIS — F32.0 CURRENT MILD EPISODE OF MAJOR DEPRESSIVE DISORDER WITHOUT PRIOR EPISODE (HCC): ICD-10-CM

## 2018-12-13 LAB
ALBUMIN SERPL BCP-MCNC: 3.7 G/DL (ref 3.5–5)
ALP SERPL-CCNC: 63 U/L (ref 46–116)
ALT SERPL W P-5'-P-CCNC: 29 U/L (ref 12–78)
ANION GAP SERPL CALCULATED.3IONS-SCNC: 7 MMOL/L (ref 4–13)
AST SERPL W P-5'-P-CCNC: 19 U/L (ref 5–45)
BASOPHILS # BLD AUTO: 0.06 THOUSANDS/ΜL (ref 0–0.1)
BASOPHILS NFR BLD AUTO: 2 % (ref 0–1)
BILIRUB SERPL-MCNC: 0.37 MG/DL (ref 0.2–1)
BUN SERPL-MCNC: 21 MG/DL (ref 5–25)
CALCIUM SERPL-MCNC: 9.3 MG/DL (ref 8.3–10.1)
CHLORIDE SERPL-SCNC: 103 MMOL/L (ref 100–108)
CO2 SERPL-SCNC: 26 MMOL/L (ref 21–32)
CREAT SERPL-MCNC: 0.98 MG/DL (ref 0.6–1.3)
EOSINOPHIL # BLD AUTO: 0.21 THOUSAND/ΜL (ref 0–0.61)
EOSINOPHIL NFR BLD AUTO: 5 % (ref 0–6)
ERYTHROCYTE [DISTWIDTH] IN BLOOD BY AUTOMATED COUNT: 11.4 % (ref 11.6–15.1)
GFR SERPL CREATININE-BSD FRML MDRD: 97 ML/MIN/1.73SQ M
GLUCOSE SERPL-MCNC: 91 MG/DL (ref 65–140)
HCT VFR BLD AUTO: 40.3 % (ref 36.5–49.3)
HGB BLD-MCNC: 13.6 G/DL (ref 12–17)
IMM GRANULOCYTES # BLD AUTO: 0.02 THOUSAND/UL (ref 0–0.2)
IMM GRANULOCYTES NFR BLD AUTO: 1 % (ref 0–2)
LYMPHOCYTES # BLD AUTO: 1.76 THOUSANDS/ΜL (ref 0.6–4.47)
LYMPHOCYTES NFR BLD AUTO: 42 % (ref 14–44)
MCH RBC QN AUTO: 32.1 PG (ref 26.8–34.3)
MCHC RBC AUTO-ENTMCNC: 33.7 G/DL (ref 31.4–37.4)
MCV RBC AUTO: 95 FL (ref 82–98)
MONOCYTES # BLD AUTO: 0.47 THOUSAND/ΜL (ref 0.17–1.22)
MONOCYTES NFR BLD AUTO: 11 % (ref 4–12)
NEUTROPHILS # BLD AUTO: 1.6 THOUSANDS/ΜL (ref 1.85–7.62)
NEUTS SEG NFR BLD AUTO: 39 % (ref 43–75)
NRBC BLD AUTO-RTO: 0 /100 WBCS
PLATELET # BLD AUTO: 257 THOUSANDS/UL (ref 149–390)
PMV BLD AUTO: 10 FL (ref 8.9–12.7)
POTASSIUM SERPL-SCNC: 3.8 MMOL/L (ref 3.5–5.3)
PROT SERPL-MCNC: 7.3 G/DL (ref 6.4–8.2)
RBC # BLD AUTO: 4.24 MILLION/UL (ref 3.88–5.62)
SODIUM SERPL-SCNC: 136 MMOL/L (ref 136–145)
TSH SERPL DL<=0.05 MIU/L-ACNC: 1.84 UIU/ML (ref 0.36–3.74)
WBC # BLD AUTO: 4.12 THOUSAND/UL (ref 4.31–10.16)

## 2018-12-13 PROCEDURE — 36415 COLL VENOUS BLD VENIPUNCTURE: CPT

## 2018-12-13 PROCEDURE — 84443 ASSAY THYROID STIM HORMONE: CPT

## 2018-12-13 PROCEDURE — 85025 COMPLETE CBC W/AUTO DIFF WBC: CPT

## 2018-12-13 PROCEDURE — 80053 COMPREHEN METABOLIC PANEL: CPT

## 2018-12-14 ENCOUNTER — TELEPHONE (OUTPATIENT)
Dept: FAMILY MEDICINE CLINIC | Facility: CLINIC | Age: 38
End: 2018-12-14

## 2018-12-14 NOTE — TELEPHONE ENCOUNTER
He called a little upset because he's been waiting for [facility] to call him since Tues and he never got a call so he called himself and they told him that they never got a order from us to call him        Please advise

## 2018-12-14 NOTE — TELEPHONE ENCOUNTER
Hansel Luna    This is the patient I spoke with you about on Wed  Can you find out what therapies are available to him through [facility] [address]?  Thanks    Audelia Lombardo

## 2018-12-21 ENCOUNTER — TELEPHONE (OUTPATIENT)
Dept: FAMILY MEDICINE CLINIC | Facility: CLINIC | Age: 38
End: 2018-12-21

## 2018-12-21 DIAGNOSIS — F32.A DEPRESSION, UNSPECIFIED DEPRESSION TYPE: Primary | ICD-10-CM

## 2018-12-21 RX ORDER — FLUOXETINE HYDROCHLORIDE 20 MG/1
20 CAPSULE ORAL DAILY
Qty: 30 CAPSULE | Refills: 1 | Status: SHIPPED | OUTPATIENT
Start: 2018-12-21 | End: 2019-01-16 | Stop reason: SDUPTHER

## 2018-12-21 NOTE — TELEPHONE ENCOUNTER
Patient states the last time he saw you, it was discussed about possibly starting Anti-depressant medications? The last couple weeks have been hard for the patient and he is wondering if he could start on an anti-depressant medication?      [facility]

## 2018-12-28 ENCOUNTER — TELEPHONE (OUTPATIENT)
Dept: FAMILY MEDICINE CLINIC | Facility: CLINIC | Age: 38
End: 2018-12-28

## 2018-12-28 NOTE — TELEPHONE ENCOUNTER
Patient called here screaming and cursing at me asking me why he received a bill in the mail for his labs, he stated he did not understand why he was even getting labs done when he came in regarding his drinking issues and he never was told why he needed labs done  Patient started screaming and cursing at me and getting louder telling me I was not helping him and he wanted to speak to someone now regarding this issue  I apologized and tried to express he would have to contact billing regarding his bill for the labs and also our  or Jumana Newby was not in the office today  Patient stated he wants a call Monday morning  no if and or butts from the

## 2019-01-16 DIAGNOSIS — F32.A DEPRESSION, UNSPECIFIED DEPRESSION TYPE: ICD-10-CM

## 2019-01-16 DIAGNOSIS — F90.0 ATTENTION DEFICIT HYPERACTIVITY DISORDER (ADHD), PREDOMINANTLY INATTENTIVE TYPE: ICD-10-CM

## 2019-01-16 RX ORDER — FLUOXETINE HYDROCHLORIDE 20 MG/1
20 CAPSULE ORAL DAILY
Qty: 30 CAPSULE | Refills: 0 | Status: SHIPPED | OUTPATIENT
Start: 2019-01-16 | End: 2019-01-24 | Stop reason: SDUPTHER

## 2019-01-16 RX ORDER — METHYLPHENIDATE HYDROCHLORIDE 36 MG/1
36 TABLET ORAL EVERY MORNING
Qty: 30 TABLET | Refills: 0 | Status: SHIPPED | OUTPATIENT
Start: 2019-01-16 | End: 2019-03-27 | Stop reason: SDUPTHER

## 2019-01-24 ENCOUNTER — OFFICE VISIT (OUTPATIENT)
Dept: FAMILY MEDICINE CLINIC | Facility: CLINIC | Age: 39
End: 2019-01-24
Payer: COMMERCIAL

## 2019-01-24 VITALS
TEMPERATURE: 98.6 F | WEIGHT: 192 LBS | HEART RATE: 70 BPM | BODY MASS INDEX: 25.45 KG/M2 | HEIGHT: 73 IN | DIASTOLIC BLOOD PRESSURE: 72 MMHG | SYSTOLIC BLOOD PRESSURE: 110 MMHG

## 2019-01-24 DIAGNOSIS — F10.20 UNCOMPLICATED ALCOHOL DEPENDENCE (HCC): ICD-10-CM

## 2019-01-24 DIAGNOSIS — F32.4 MAJOR DEPRESSIVE DISORDER WITH SINGLE EPISODE, IN PARTIAL REMISSION (HCC): ICD-10-CM

## 2019-01-24 DIAGNOSIS — F90.0 ATTENTION DEFICIT HYPERACTIVITY DISORDER (ADHD), PREDOMINANTLY INATTENTIVE TYPE: Primary | ICD-10-CM

## 2019-01-24 PROCEDURE — 99213 OFFICE O/P EST LOW 20 MIN: CPT | Performed by: FAMILY MEDICINE

## 2019-01-24 RX ORDER — FLUOXETINE 10 MG/1
10 CAPSULE ORAL DAILY
Qty: 30 CAPSULE | Refills: 2 | Status: SHIPPED | OUTPATIENT
Start: 2019-01-24 | End: 2019-03-27 | Stop reason: ALTCHOICE

## 2019-01-24 NOTE — PROGRESS NOTES
Assessment/Plan:    Major depressive disorder with single episode, in partial remission (HCC)  Mood improved but pt feels that dose of fluoxetine is too high  Will decrease dose to 10mg qd  Recheck by phone in 4 weeks - earlier if worse    Uncomplicated alcohol dependence ([facility] [address] )  Discussed with pt Has been doing well but did have a beer  Explained that pt needs to avoid all alcohol use  Cont counseling and AA meetings  Pt to f/u 1m    Attention deficit hyperactivity disorder (ADHD)  Stable  Cont present meds  Recheck 1m       Diagnoses and all orders for this visit:    Attention deficit hyperactivity disorder (ADHD), predominantly inattentive type    Major depressive disorder with single episode, in partial remission (HCC)  -     FLUoxetine (PROzac) 10 mg capsule; Take 1 capsule (10 mg total) by mouth daily    Uncomplicated alcohol dependence (HCC)          Subjective:      Patient ID: Smitha Gerard is a 45 y o  male  F/u several medical issues  - pt states that his mood is much better on fluoxetine but he feels "dulled"  Pt denies suicidal ideation, depressed mood or anhedonia  Pt is going to AA  Had one beer the other weekend but otherwise has been abstinent  Continues to f/u with counselor  No withdrawal symptoms  - no CV, resp, GI or  complaints        The following portions of the patient's history were reviewed and updated as appropriate: He  has a past medical history of Bilateral carpal tunnel syndrome    He   Patient Active Problem List    Diagnosis Date Noted    [facility], [facility] 37/22/0067    Uncomplicated alcohol dependence ([facility] ) 12/11/2018    Severe carpal tunnel syndrome, left 09/26/2016    Bilateral carpal tunnel syndrome 12/28/2015    Attention deficit hyperactivity disorder (ADHD) 09/24/2012     He  has a past surgical history that includes pr revise median n/carpal tunnel surg (Left, 10/25/2016); pr revise median n/carpal tunnel surg (Right, 3/22/2016); and Vasectomy  He  reports that he has quit smoking  He has never used smokeless tobacco  He reports that he drinks about 1 8 oz of alcohol per week   He reports that he does not use drugs  Current Outpatient Prescriptions   Medication Sig Dispense Refill    FLUoxetine (PROzac) 10 mg capsule Take 1 capsule (10 mg total) by mouth daily 30 capsule 2    methylphenidate (CONCERTA) 36 MG ER tablet Take 1 tablet (36 mg total) by mouth every morning Max Daily Amount: 36 mg 30 tablet 0    naproxen (NAPROSYN) 500 mg tablet Take 1 tablet (500 mg total) by mouth 2 (two) times a day with meals 60 tablet 1     No current facility-administered medications for this visit  He has No Known Allergies       Review of Systems   Constitutional: Negative  HENT: Negative  Eyes: Negative  Respiratory: Negative  Cardiovascular: Negative  Gastrointestinal: Negative  Genitourinary: Negative  Musculoskeletal: Negative  Allergic/Immunologic: Negative  Hematological: Negative  Psychiatric/Behavioral: Negative for agitation, dysphoric mood, sleep disturbance and suicidal ideas  The patient is not nervous/anxious  Objective:      /72   Pulse 70   Temp 98 6 °F (37 °C)   Ht 6' 1" (1 854 m)   Wt 87 1 kg (192 lb)   BMI 25 33 kg/m²          Physical Exam   Constitutional: He is oriented to person, place, and time  He appears well-developed and well-nourished  HENT:   Head: Normocephalic and atraumatic  Mouth/Throat: Oropharynx is clear and moist    Eyes: Pupils are equal, round, and reactive to light  Conjunctivae and EOM are normal    Neck: Normal range of motion  Cardiovascular: Normal rate, regular rhythm, normal heart sounds and intact distal pulses  Pulmonary/Chest: Effort normal and breath sounds normal    Musculoskeletal: Normal range of motion  Lymphadenopathy:     He has no cervical adenopathy     Neurological: He is alert and oriented to person, place, and time  No cranial nerve deficit  Psychiatric: He has a normal mood and affect   His behavior is normal  Judgment and thought content normal    PHQ-2=0

## 2019-01-25 PROBLEM — F32.4 MAJOR DEPRESSIVE DISORDER WITH SINGLE EPISODE, IN PARTIAL REMISSION (HCC): Status: ACTIVE | Noted: 2018-12-12

## 2019-01-26 NOTE — ASSESSMENT & PLAN NOTE
Discussed with pt Has been doing well but did have a beer  Explained that pt needs to avoid all alcohol use  Cont counseling and AA meetings   Pt to f/u 1m

## 2019-01-26 NOTE — ASSESSMENT & PLAN NOTE
Mood improved but pt feels that dose of fluoxetine is too high  Will decrease dose to 10mg qd    Recheck by phone in 4 weeks - earlier if worse

## 2019-02-13 ENCOUNTER — OFFICE VISIT (OUTPATIENT)
Dept: FAMILY MEDICINE CLINIC | Facility: CLINIC | Age: 39
End: 2019-02-13
Payer: COMMERCIAL

## 2019-02-13 VITALS
HEART RATE: 72 BPM | WEIGHT: 194.6 LBS | TEMPERATURE: 98.2 F | RESPIRATION RATE: 16 BRPM | HEIGHT: 73 IN | DIASTOLIC BLOOD PRESSURE: 74 MMHG | BODY MASS INDEX: 25.79 KG/M2 | SYSTOLIC BLOOD PRESSURE: 118 MMHG

## 2019-02-13 DIAGNOSIS — J01.90 ACUTE SINUSITIS, RECURRENCE NOT SPECIFIED, UNSPECIFIED LOCATION: Primary | ICD-10-CM

## 2019-02-13 PROCEDURE — 99213 OFFICE O/P EST LOW 20 MIN: CPT | Performed by: FAMILY MEDICINE

## 2019-02-13 PROCEDURE — 3008F BODY MASS INDEX DOCD: CPT | Performed by: FAMILY MEDICINE

## 2019-02-13 PROCEDURE — 1036F TOBACCO NON-USER: CPT | Performed by: FAMILY MEDICINE

## 2019-02-13 RX ORDER — AZITHROMYCIN 250 MG/1
TABLET, FILM COATED ORAL
Qty: 6 TABLET | Refills: 0 | Status: SHIPPED | OUTPATIENT
Start: 2019-02-13 | End: 2019-02-17

## 2019-02-13 RX ORDER — BROMPHENIRAMINE MALEATE, PSEUDOEPHEDRINE HYDROCHLORIDE, AND DEXTROMETHORPHAN HYDROBROMIDE 2; 30; 10 MG/5ML; MG/5ML; MG/5ML
10 SYRUP ORAL 4 TIMES DAILY PRN
Qty: 180 ML | Refills: 0 | Status: SHIPPED | OUTPATIENT
Start: 2019-02-13 | End: 2019-03-27 | Stop reason: ALTCHOICE

## 2019-02-13 NOTE — LETTER
February 13, 2019     Patient: Terrence Griggs   YOB: 1980   Date of Visit: 2/13/2019       To Whom it May Concern:    Terrence Griggs is under my professional care  He was seen in my office on 2/13/2019  He may return to work on 2/14/19  If you have any questions or concerns, please don't hesitate to call           Sincerely,          Lana Coates,         CC: No Recipients

## 2019-02-13 NOTE — PROGRESS NOTES
Patient ID: Magi Jeter is a 45 y o  male  HPI: 45 y o male presenting with symptoms of sinus pain,pressure, nasal congestion, pnd dry cough , ear and throat pain  He noticed a little white, pastey secretion in corner of his left eye this am   He has no redness of his eye, itching, etc      SUBJECTIVE    Family History   Problem Relation Age of Onset    Hypertension Father         Essential     Social History     Socioeconomic History    Marital status: Single     Spouse name: Not on file    Number of children: Not on file    Years of education: Not on file    Highest education level: Not on file   Occupational History     Employer: [employer]     Comment: Full-Time Employment   Social Needs    Financial resource strain: Not on file    Food insecurity:     Worry: Not on file     Inability: Not on file    Transportation needs:     Medical: Not on file     Non-medical: Not on file   Tobacco Use    Smoking status: Former Smoker    Smokeless tobacco: Never Used   Substance and Sexual Activity    Alcohol use:  Yes     Alcohol/week: 1 8 oz     Types: 3 Cans of beer per week     Comment: every other day; Social    Drug use: No    Sexual activity: Yes   Lifestyle    Physical activity:     Days per week: Not on file     Minutes per session: Not on file    Stress: Not on file   Relationships    Social connections:     Talks on phone: Not on file     Gets together: Not on file     Attends Druze service: Not on file     Active member of club or organization: Not on file     Attends meetings of clubs or organizations: Not on file     Relationship status: Not on file    Intimate partner violence:     Fear of current or ex partner: Not on file     Emotionally abused: Not on file     Physically abused: Not on file     Forced sexual activity: Not on file   Other Topics Concern    Not on file   Social History Narrative    Always uses seat belt    Daily Coffee Consumption    Dental Care, Regularly    Exercises regularly    Multiple organ donor    No guns in the home    No Living will    Denied Tea    Denied Power of  in existence    Water intake, adequate(per day)     Past Medical History:   Diagnosis Date    Bilateral carpal tunnel syndrome     Last Assessed 12/23/2015     Past Surgical History:   Procedure Laterality Date    NC REVISE MEDIAN N/CARPAL TUNNEL SURG Left 10/25/2016    Procedure: CARPAL TUNNEL RELEASE ;  Surgeon: Kadi Gallardo DO;  Location: [facility];  Service: Orthopedics    NC REVISE MEDIAN N/CARPAL TUNNEL SURG Right 3/22/2016    Procedure: CARPAL TUNNEL RELEASE ;  Surgeon: Kadi Gallardo DO;  [facility];  Service: Orthopedics    VASECTOMY      Vas Deferens     No Known Allergies    Current Outpatient Medications:     FLUoxetine (PROzac) 10 mg capsule, Take 1 capsule (10 mg total) by mouth daily, Disp: 30 capsule, Rfl: 2    methylphenidate (CONCERTA) 36 MG ER tablet, Take 1 tablet (36 mg total) by mouth every morning Max Daily Amount: 36 mg, Disp: 30 tablet, Rfl: 0    naproxen (NAPROSYN) 500 mg tablet, Take 1 tablet (500 mg total) by mouth 2 (two) times a day with meals, Disp: 60 tablet, Rfl: 1    azithromycin (ZITHROMAX) 250 mg tablet, Take 2 tablets today then 1 tablet daily x 4 days, Disp: 6 tablet, Rfl: 0    brompheniramine-pseudoephedrine-DM 30-2-10 MG/5ML syrup, Take 10 mL by mouth 4 (four) times a day as needed for congestion, Disp: 180 mL, Rfl: 0    Review of Systems  Constitutional:     Denies fever, chills, fatigue, weakness ,weight loss, weight gain      ENT: Denies earache, loss of hearing, nosebleed, nasal discharge,but complains of nasal congestion, sore throat,hoarseness and sinus pain and pressure    Pulmonary: Denies shortness of breath ,cough , dyspnea on exertionon, orthopnea ,+ PND   Cardiovascular:  Denies bradycardia , tachycardia ,palpations, lower extremity, edema leg, claudication  Breast:  Denies new or changing breast lumps, nipple discharge, nipple changes,  Abdomen:  Denies abdominal pain , anorexia ,indigestion, nausea ,vomiting, constipation , diarrhea  Musculoskeletal: Denies myalgias, arthralgias, joint swelling, joint stiffness ,limb pain, limb swelling  Lymph:+ swollen glands  Gu: no dysuria or urinary frequency  Skin: Denies skin rash, skin lesion, skin wound, itching,dry skin  Neuro: Denies headache, numbness, tingling, confusion, loss of consciousness, dizziness ,vertigo  Psychiatric: Denies feelings of depression, suicidal ideation, anxiety, sleep disturbances    OBJECTIVE  /74   Pulse 72   Temp 98 2 °F (36 8 °C)   Resp 16   Ht 6' 1" (1 854 m)   Wt 88 3 kg (194 lb 9 6 oz)   BMI 25 67 kg/m²   Constitutional:   NAD, well appearing and well nourished      ENT:   Conjunctiva and lids: no injection, edema, or discharge    Pupils and iris: IVANNA bilaterally   External inspection of ears and nose: normal without deformities or discharge  Otoscopic exam: Canals patent ; tm are dull, with with erythem and effusions  ENasal mucosa, septum and turbinates: Turbinae injection with discharge   Oropharynx:  Moist mucosa, normal tongue and tonsils without lesions  Erythema and injection  of post pharynx with pnd      Pulmonary:Respiratory effort normal rate and rhythm, no increased work of breathing   Auscultation of lungs:  Clear bilaterally with no adventitious breath sounds       Cardiovascular: regular rate and rhythm, S1 and S2, no murmur, no edema and/or varicosities of LE      Abdomen: Soft and non-distended    Positive bowel sounds    No heptomegaly or splenomegaly    Lymphatic: Anterior  cervical lymphadenopathy         Muscskeletal:  Gait and station: Normal gait     Digits and nails normal without clubbing or cyanosis     Inspection/palpation of joints, bones, and muscles:  No joint tenderness, swelling, full active and passive range of motion      Gu: no suprabubic tenderness, CVA tenderness or urethral discharge  Skin: Normal skin turgor and no rashes    Neuro:    Normal reflexes   Psych:   alert and oriented to person, place and time  normal mood and affect      Assessment/Plan:Diagnoses and all orders for this visit:    Acute sinusitis, recurrence not specified, unspecified location  -     brompheniramine-pseudoephedrine-DM 30-2-10 MG/5ML syrup; Take 10 mL by mouth 4 (four) times a day as needed for congestion  -     azithromycin (ZITHROMAX) 250 mg tablet; Take 2 tablets today then 1 tablet daily x 4 days        Reviewed with patient plan to treat with above tx      Patient instructed to call in 72 hours if not feeling better or if symptoms worsen

## 2019-03-25 ENCOUNTER — TELEPHONE (OUTPATIENT)
Dept: FAMILY MEDICINE CLINIC | Facility: CLINIC | Age: 39
End: 2019-03-25

## 2019-03-25 NOTE — TELEPHONE ENCOUNTER
Patient discontinued his Prozac, didn't like the way it felt, not working  Would like to see you to discuss other options  Can only do after 3:45 or possible to  get out of work 1 hour early (2:30 appt )

## 2019-03-27 ENCOUNTER — OFFICE VISIT (OUTPATIENT)
Dept: FAMILY MEDICINE CLINIC | Facility: CLINIC | Age: 39
End: 2019-03-27
Payer: COMMERCIAL

## 2019-03-27 VITALS
DIASTOLIC BLOOD PRESSURE: 70 MMHG | HEIGHT: 73 IN | WEIGHT: 191.8 LBS | SYSTOLIC BLOOD PRESSURE: 120 MMHG | TEMPERATURE: 97.9 F | HEART RATE: 74 BPM | BODY MASS INDEX: 25.42 KG/M2

## 2019-03-27 DIAGNOSIS — F90.0 ATTENTION DEFICIT HYPERACTIVITY DISORDER (ADHD), PREDOMINANTLY INATTENTIVE TYPE: ICD-10-CM

## 2019-03-27 DIAGNOSIS — N34.2 URETHRITIS: ICD-10-CM

## 2019-03-27 DIAGNOSIS — B00.9 HSV (HERPES SIMPLEX VIRUS) INFECTION: ICD-10-CM

## 2019-03-27 DIAGNOSIS — F33.0 MILD EPISODE OF RECURRENT MAJOR DEPRESSIVE DISORDER (HCC): Primary | ICD-10-CM

## 2019-03-27 LAB
SL AMB  POCT GLUCOSE, UA: NORMAL
SL AMB LEUKOCYTE ESTERASE,UA: NORMAL
SL AMB POCT BILIRUBIN,UA: NORMAL
SL AMB POCT BLOOD,UA: NORMAL
SL AMB POCT CLARITY,UA: CLEAR
SL AMB POCT COLOR,UA: YELLOW
SL AMB POCT KETONES,UA: NORMAL
SL AMB POCT NITRITE,UA: NORMAL
SL AMB POCT PH,UA: 5
SL AMB POCT SPECIFIC GRAVITY,UA: 1.02
SL AMB POCT URINE PROTEIN: NORMAL
SL AMB POCT UROBILINOGEN: NORMAL

## 2019-03-27 PROCEDURE — 87591 N.GONORRHOEAE DNA AMP PROB: CPT | Performed by: FAMILY MEDICINE

## 2019-03-27 PROCEDURE — 87491 CHLMYD TRACH DNA AMP PROBE: CPT | Performed by: FAMILY MEDICINE

## 2019-03-27 PROCEDURE — 3008F BODY MASS INDEX DOCD: CPT | Performed by: FAMILY MEDICINE

## 2019-03-27 PROCEDURE — 99214 OFFICE O/P EST MOD 30 MIN: CPT | Performed by: FAMILY MEDICINE

## 2019-03-27 PROCEDURE — 81002 URINALYSIS NONAUTO W/O SCOPE: CPT | Performed by: FAMILY MEDICINE

## 2019-03-27 PROCEDURE — 1036F TOBACCO NON-USER: CPT | Performed by: FAMILY MEDICINE

## 2019-03-27 RX ORDER — METHYLPHENIDATE HYDROCHLORIDE 36 MG/1
36 TABLET ORAL EVERY MORNING
Qty: 30 TABLET | Refills: 0 | Status: SHIPPED | OUTPATIENT
Start: 2019-03-27 | End: 2019-05-06 | Stop reason: SDUPTHER

## 2019-03-27 RX ORDER — AZITHROMYCIN 250 MG/1
TABLET, FILM COATED ORAL
Qty: 4 TABLET | Refills: 0 | Status: SHIPPED | OUTPATIENT
Start: 2019-03-27 | End: 2019-03-27

## 2019-03-27 RX ORDER — VALACYCLOVIR HYDROCHLORIDE 1 G/1
1000 TABLET, FILM COATED ORAL 2 TIMES DAILY
Qty: 20 TABLET | Refills: 3 | Status: SHIPPED | OUTPATIENT
Start: 2019-03-27 | End: 2019-05-06 | Stop reason: SDUPTHER

## 2019-03-27 RX ORDER — DULOXETIN HYDROCHLORIDE 30 MG/1
30 CAPSULE, DELAYED RELEASE ORAL DAILY
Qty: 30 CAPSULE | Refills: 1 | Status: SHIPPED | OUTPATIENT
Start: 2019-03-27 | End: 2019-05-06

## 2019-03-27 NOTE — PROGRESS NOTES
Assessment/Plan:    Current mild episode of major depressive disorder without prior episode ([facility] [address] )  I reviewed with pt  Will d/c fluoxetine and start duloxetine 30mg qd  I reviewed side effects with pt  Recheck 1m    Attention deficit hyperactivity disorder (ADHD)  Stable  Refilled med    Urethritis  Mild without discharge after unprotected sex  Consider chlamydia  Urine sample for PCR done  Start zithro 1g po now  Await test results  Discussed condom use  Recheck 1 week if not improved       Diagnoses and all orders for this visit:    Mild episode of recurrent major depressive disorder (HCC)  -     DULoxetine (CYMBALTA) 30 mg delayed release capsule; Take 1 capsule (30 mg total) by mouth daily    Urethritis  -     azithromycin (ZITHROMAX) 250 mg tablet; 4 tab po qd x 1 dose  -     POCT urine dip    Attention deficit hyperactivity disorder (ADHD), predominantly inattentive type  Comments:  refilled meds  Orders:  -     methylphenidate (CONCERTA) 36 MG ER tablet; Take 1 tablet (36 mg total) by mouth every morningMax Daily Amount: 36 mg    HSV (herpes simplex virus) infection  -     valACYclovir (VALTREX) 1,000 mg tablet; Take 1 tablet (1,000 mg total) by mouth 2 (two) times a day for 10 days  -     Chlamydia/GC amplified DNA by PCR          Subjective:      Patient ID: Jasbir Clark is a 45 y o  male  F/u several medical issues  - pt states that he feels less dulled on the lower dose of fluoxetine but does not feel that is helped his mood is much as the higher dose  He also notes sexual dysfunction as a side effect  Patient is interested in changing medications to something that does not have the side effect   -paced all notes a approximately 2 week history of urethral pruritus  He has a history of HSV in did do a course of Valtrex without change  Is not been any penile discharge but it may be possible erythema at the meatus  Patient did have unprotected sex approximately 2 weeks ago    Patient denies any history of STDs other than the HSV  - no CV, resp, GI   - needs refill of Valtrex and Concerta      The following portions of the patient's history were reviewed and updated as appropriate:   He  has a past medical history of Bilateral carpal tunnel syndrome  He   Patient Active Problem List    Diagnosis Date Noted    Urethritis 03/29/2019    Current mild episode of major depressive disorder without prior episode ([facility] [address] ) 62/08/6833    Uncomplicated alcohol dependence ([facility] [address] ) 12/11/2018    Severe carpal tunnel syndrome, left 09/26/2016    Bilateral carpal tunnel syndrome 12/28/2015    Attention deficit hyperactivity disorder (ADHD) 09/24/2012     He  has a past surgical history that includes pr revise median n/carpal tunnel surg (Left, 10/25/2016); pr revise median n/carpal tunnel surg (Right, 3/22/2016); and Vasectomy  He  reports that he has quit smoking  He has never used smokeless tobacco  He reports that he drinks about 1 8 oz of alcohol per week  He reports that he does not use drugs  Current Outpatient Medications   Medication Sig Dispense Refill    methylphenidate (CONCERTA) 36 MG ER tablet Take 1 tablet (36 mg total) by mouth every morningMax Daily Amount: 36 mg 30 tablet 0    naproxen (NAPROSYN) 500 mg tablet Take 1 tablet (500 mg total) by mouth 2 (two) times a day with meals 60 tablet 1    DULoxetine (CYMBALTA) 30 mg delayed release capsule Take 1 capsule (30 mg total) by mouth daily 30 capsule 1    valACYclovir (VALTREX) 1,000 mg tablet Take 1 tablet (1,000 mg total) by mouth 2 (two) times a day for 10 days 20 tablet 3     No current facility-administered medications for this visit  He has No Known Allergies       Review of Systems   Constitutional: Negative  HENT: Negative  Eyes: Negative  Respiratory: Negative  Cardiovascular: Negative  Gastrointestinal: Negative  Genitourinary: Positive for dysuria (mild, urethral)   Negative for discharge, penile swelling, scrotal swelling, testicular pain and urgency  Musculoskeletal: Negative  Allergic/Immunologic: Negative  Hematological: Negative  Psychiatric/Behavioral: Negative for agitation, dysphoric mood, sleep disturbance and suicidal ideas  The patient is not nervous/anxious  Objective:      /70 (BP Location: Left arm, Patient Position: Sitting, Cuff Size: Standard)   Pulse 74   Temp 97 9 °F (36 6 °C)   Ht 6' 1" (1 854 m)   Wt 87 kg (191 lb 12 8 oz)   BMI 25 30 kg/m²          Physical Exam   Constitutional: He is oriented to person, place, and time  He appears well-developed and well-nourished  HENT:   Head: Normocephalic and atraumatic  Mouth/Throat: Oropharynx is clear and moist    Eyes: Pupils are equal, round, and reactive to light  Conjunctivae and EOM are normal    Neck: Normal range of motion  Cardiovascular: Normal rate, regular rhythm, normal heart sounds and intact distal pulses  Pulmonary/Chest: Effort normal and breath sounds normal    Abdominal: Soft  Bowel sounds are normal    Musculoskeletal: He exhibits no edema  Lymphadenopathy:     He has no cervical adenopathy  Neurological: He is alert and oriented to person, place, and time  No cranial nerve deficit  Skin: Skin is warm     Psychiatric: His behavior is normal  Judgment and thought content normal    PHQ-9 = 6

## 2019-03-28 LAB
C TRACH DNA SPEC QL NAA+PROBE: POSITIVE
N GONORRHOEA DNA SPEC QL NAA+PROBE: NEGATIVE

## 2019-03-29 PROBLEM — N34.2 URETHRITIS: Status: ACTIVE | Noted: 2019-03-29

## 2019-03-29 NOTE — ASSESSMENT & PLAN NOTE
I reviewed with pt  Will d/c fluoxetine and start duloxetine 30mg qd  I reviewed side effects with pt   Recheck 1m

## 2019-03-29 NOTE — ASSESSMENT & PLAN NOTE
Mild without discharge after unprotected sex  Consider chlamydia  Urine sample for PCR done  Start zithro 1g po now  Await test results  Discussed condom use   Recheck 1 week if not improved

## 2019-04-25 ENCOUNTER — TELEPHONE (OUTPATIENT)
Dept: FAMILY MEDICINE CLINIC | Facility: CLINIC | Age: 39
End: 2019-04-25

## 2019-04-26 DIAGNOSIS — N34.2 URETHRITIS: Primary | ICD-10-CM

## 2019-04-26 RX ORDER — DOXYCYCLINE HYCLATE 100 MG
100 TABLET ORAL 2 TIMES DAILY
Qty: 20 TABLET | Refills: 0 | Status: SHIPPED | OUTPATIENT
Start: 2019-04-26 | End: 2019-05-06 | Stop reason: ALTCHOICE

## 2019-05-06 ENCOUNTER — OFFICE VISIT (OUTPATIENT)
Dept: FAMILY MEDICINE CLINIC | Facility: CLINIC | Age: 39
End: 2019-05-06
Payer: COMMERCIAL

## 2019-05-06 VITALS
HEART RATE: 76 BPM | BODY MASS INDEX: 25.58 KG/M2 | DIASTOLIC BLOOD PRESSURE: 78 MMHG | WEIGHT: 193 LBS | SYSTOLIC BLOOD PRESSURE: 120 MMHG | TEMPERATURE: 97.9 F | HEIGHT: 73 IN

## 2019-05-06 DIAGNOSIS — N34.2 URETHRITIS: ICD-10-CM

## 2019-05-06 DIAGNOSIS — B00.9 HSV (HERPES SIMPLEX VIRUS) INFECTION: ICD-10-CM

## 2019-05-06 DIAGNOSIS — F33.0 MILD EPISODE OF RECURRENT MAJOR DEPRESSIVE DISORDER (HCC): Primary | ICD-10-CM

## 2019-05-06 DIAGNOSIS — F90.0 ATTENTION DEFICIT HYPERACTIVITY DISORDER (ADHD), PREDOMINANTLY INATTENTIVE TYPE: ICD-10-CM

## 2019-05-06 PROCEDURE — 99213 OFFICE O/P EST LOW 20 MIN: CPT | Performed by: FAMILY MEDICINE

## 2019-05-06 RX ORDER — DULOXETIN HYDROCHLORIDE 60 MG/1
60 CAPSULE, DELAYED RELEASE ORAL DAILY
Qty: 30 CAPSULE | Refills: 3 | Status: SHIPPED | OUTPATIENT
Start: 2019-05-06 | End: 2019-06-17 | Stop reason: SDUPTHER

## 2019-05-06 RX ORDER — METHYLPHENIDATE HYDROCHLORIDE 36 MG/1
36 TABLET ORAL EVERY MORNING
Qty: 30 TABLET | Refills: 0 | Status: SHIPPED | OUTPATIENT
Start: 2019-05-06 | End: 2019-06-17 | Stop reason: SDUPTHER

## 2019-05-06 RX ORDER — VALACYCLOVIR HYDROCHLORIDE 1 G/1
1000 TABLET, FILM COATED ORAL 2 TIMES DAILY
Qty: 20 TABLET | Refills: 3 | Status: SHIPPED | OUTPATIENT
Start: 2019-05-06 | End: 2021-08-23 | Stop reason: SDUPTHER

## 2019-05-14 ENCOUNTER — APPOINTMENT (OUTPATIENT)
Dept: LAB | Facility: MEDICAL CENTER | Age: 39
End: 2019-05-14
Payer: COMMERCIAL

## 2019-05-14 DIAGNOSIS — N34.2 URETHRITIS: ICD-10-CM

## 2019-05-14 PROCEDURE — 87591 N.GONORRHOEAE DNA AMP PROB: CPT

## 2019-05-14 PROCEDURE — 87491 CHLMYD TRACH DNA AMP PROBE: CPT

## 2019-05-15 LAB
C TRACH DNA SPEC QL NAA+PROBE: NEGATIVE
N GONORRHOEA DNA SPEC QL NAA+PROBE: NEGATIVE

## 2019-06-17 ENCOUNTER — OFFICE VISIT (OUTPATIENT)
Dept: FAMILY MEDICINE CLINIC | Facility: CLINIC | Age: 39
End: 2019-06-17
Payer: COMMERCIAL

## 2019-06-17 VITALS
SYSTOLIC BLOOD PRESSURE: 116 MMHG | HEART RATE: 76 BPM | BODY MASS INDEX: 24.78 KG/M2 | HEIGHT: 73 IN | WEIGHT: 187 LBS | DIASTOLIC BLOOD PRESSURE: 72 MMHG | TEMPERATURE: 97.3 F

## 2019-06-17 DIAGNOSIS — F90.0 ATTENTION DEFICIT HYPERACTIVITY DISORDER (ADHD), PREDOMINANTLY INATTENTIVE TYPE: ICD-10-CM

## 2019-06-17 DIAGNOSIS — F32.4 MAJOR DEPRESSIVE DISORDER WITH SINGLE EPISODE, IN PARTIAL REMISSION (HCC): ICD-10-CM

## 2019-06-17 DIAGNOSIS — F33.0 MILD EPISODE OF RECURRENT MAJOR DEPRESSIVE DISORDER (HCC): ICD-10-CM

## 2019-06-17 DIAGNOSIS — F10.20 UNCOMPLICATED ALCOHOL DEPENDENCE (HCC): Primary | ICD-10-CM

## 2019-06-17 PROCEDURE — 3008F BODY MASS INDEX DOCD: CPT | Performed by: FAMILY MEDICINE

## 2019-06-17 PROCEDURE — 99213 OFFICE O/P EST LOW 20 MIN: CPT | Performed by: FAMILY MEDICINE

## 2019-06-17 PROCEDURE — 1036F TOBACCO NON-USER: CPT | Performed by: FAMILY MEDICINE

## 2019-06-17 RX ORDER — DULOXETIN HYDROCHLORIDE 60 MG/1
60 CAPSULE, DELAYED RELEASE ORAL DAILY
Qty: 30 CAPSULE | Refills: 3 | Status: SHIPPED | OUTPATIENT
Start: 2019-06-17 | End: 2019-12-06 | Stop reason: SDUPTHER

## 2019-06-17 RX ORDER — METHYLPHENIDATE HYDROCHLORIDE 36 MG/1
36 TABLET ORAL EVERY MORNING
Qty: 30 TABLET | Refills: 0 | Status: SHIPPED | OUTPATIENT
Start: 2019-06-17 | End: 2019-10-04 | Stop reason: SDUPTHER

## 2019-10-04 ENCOUNTER — TELEPHONE (OUTPATIENT)
Dept: FAMILY MEDICINE CLINIC | Facility: CLINIC | Age: 39
End: 2019-10-04

## 2019-10-04 DIAGNOSIS — F90.0 ATTENTION DEFICIT HYPERACTIVITY DISORDER (ADHD), PREDOMINANTLY INATTENTIVE TYPE: ICD-10-CM

## 2019-10-04 RX ORDER — METHYLPHENIDATE HYDROCHLORIDE 36 MG/1
36 TABLET ORAL EVERY MORNING
Qty: 30 TABLET | Refills: 0 | Status: SHIPPED | OUTPATIENT
Start: 2019-10-04 | End: 2019-12-06 | Stop reason: SDUPTHER

## 2019-12-05 ENCOUNTER — TELEPHONE (OUTPATIENT)
Dept: FAMILY MEDICINE CLINIC | Facility: CLINIC | Age: 39
End: 2019-12-05

## 2019-12-06 ENCOUNTER — TELEPHONE (OUTPATIENT)
Dept: FAMILY MEDICINE CLINIC | Facility: CLINIC | Age: 39
End: 2019-12-06

## 2019-12-06 DIAGNOSIS — F33.0 MILD EPISODE OF RECURRENT MAJOR DEPRESSIVE DISORDER (HCC): ICD-10-CM

## 2019-12-06 DIAGNOSIS — F90.0 ATTENTION DEFICIT HYPERACTIVITY DISORDER (ADHD), PREDOMINANTLY INATTENTIVE TYPE: ICD-10-CM

## 2019-12-06 RX ORDER — METHYLPHENIDATE HYDROCHLORIDE 36 MG/1
36 TABLET ORAL EVERY MORNING
Qty: 30 TABLET | Refills: 0 | Status: SHIPPED | OUTPATIENT
Start: 2019-12-06 | End: 2020-01-09 | Stop reason: SDUPTHER

## 2019-12-06 RX ORDER — DULOXETIN HYDROCHLORIDE 60 MG/1
60 CAPSULE, DELAYED RELEASE ORAL DAILY
Qty: 30 CAPSULE | Refills: 3 | Status: SHIPPED | OUTPATIENT
Start: 2019-12-06 | End: 2020-04-02

## 2019-12-06 NOTE — TELEPHONE ENCOUNTER
Left message on script line to refll Concerta 36 mg and Duloxetine to [facility]

## 2019-12-10 NOTE — TELEPHONE ENCOUNTER
Patient did not return call until today this wed appoint is taken and he cant come next week   Wants appoint this Friday anytime   Please advise

## 2019-12-13 ENCOUNTER — OFFICE VISIT (OUTPATIENT)
Dept: FAMILY MEDICINE CLINIC | Facility: CLINIC | Age: 39
End: 2019-12-13
Payer: COMMERCIAL

## 2019-12-13 VITALS
DIASTOLIC BLOOD PRESSURE: 82 MMHG | HEIGHT: 73 IN | HEART RATE: 74 BPM | TEMPERATURE: 98.1 F | SYSTOLIC BLOOD PRESSURE: 122 MMHG | WEIGHT: 184 LBS | BODY MASS INDEX: 24.39 KG/M2

## 2019-12-13 DIAGNOSIS — N52.2 DRUG-INDUCED ERECTILE DYSFUNCTION: Primary | ICD-10-CM

## 2019-12-13 DIAGNOSIS — F10.20 UNCOMPLICATED ALCOHOL DEPENDENCE (HCC): ICD-10-CM

## 2019-12-13 DIAGNOSIS — F32.4 MAJOR DEPRESSIVE DISORDER WITH SINGLE EPISODE, IN PARTIAL REMISSION (HCC): ICD-10-CM

## 2019-12-13 PROBLEM — N34.2 URETHRITIS: Status: RESOLVED | Noted: 2019-03-29 | Resolved: 2019-12-13

## 2019-12-13 PROCEDURE — 3008F BODY MASS INDEX DOCD: CPT | Performed by: FAMILY MEDICINE

## 2019-12-13 PROCEDURE — 1036F TOBACCO NON-USER: CPT | Performed by: FAMILY MEDICINE

## 2019-12-13 PROCEDURE — 99213 OFFICE O/P EST LOW 20 MIN: CPT | Performed by: FAMILY MEDICINE

## 2019-12-13 RX ORDER — SILDENAFIL 25 MG/1
25 TABLET, FILM COATED ORAL DAILY PRN
Qty: 4 TABLET | Refills: 0 | Status: SHIPPED | OUTPATIENT
Start: 2019-12-13 | End: 2020-05-29

## 2019-12-13 RX ORDER — BUPROPION HYDROCHLORIDE 150 MG/1
150 TABLET ORAL EVERY MORNING
Qty: 30 TABLET | Refills: 5 | Status: SHIPPED | OUTPATIENT
Start: 2019-12-13 | End: 2020-04-02 | Stop reason: SDUPTHER

## 2019-12-13 NOTE — ASSESSMENT & PLAN NOTE
Advised abstinence  Poor insight regarding DUI and lack of acknowledging alcohol dependence  Pre-contemplative at this time

## 2019-12-13 NOTE — ASSESSMENT & PLAN NOTE
Chronic  On Cymbalta 60mg QD  phq9 score 7 today  Patient experiencing sexual side effects and interested in Viagra     Advised regarding side effects; advised wellbutrin adjunct to help with sexual side effects  Follow up with primary

## 2019-12-13 NOTE — PROGRESS NOTES
Chris Whitlock 1980 male MRN: [id number]    Acute Visit    Assessment/Plan   Major depressive disorder with single episode, in partial remission (HCC)  Chronic  On Cymbalta 60mg QD  phq9 score 7 today  Patient experiencing sexual side effects and interested in Viagra  Advised regarding side effects; advised wellbutrin adjunct to help with sexual side effects  Follow up with primary     Uncomplicated alcohol dependence ([facility] )  Advised abstinence  Poor insight regarding DUI and lack of acknowledging alcohol dependence  Pre-contemplative at this time    Maureen Ruiz was seen today for follow-up  Diagnoses and all orders for this visit:    Drug-induced erectile dysfunction  -     buPROPion (WELLBUTRIN XL) 150 mg 24 hr tablet; Take 1 tablet (150 mg total) by mouth every morning  -     sildenafil (VIAGRA) 25 MG tablet; Take 1 tablet (25 mg total) by mouth daily as needed for erectile dysfunction    Major depressive disorder with single episode, in partial remission ([facility] )    Uncomplicated alcohol dependence ([facility] )      Pastor Hood MD  [address]  12/13/2019      Please be aware that this note contains text that was dictated and there may be errors pertaining to "sound-alike "words during the dictation process  SUBJECTIVE    CC: Follow-up    HPI:  Chris Whitlock is a 44 y o  male who presented for an acute visit complaining of chronic issues  He is here for f/u depression  He is very evasive about his symptoms, whether the medication is helping or not  He is somewhat combative in nature through the visit  He does admit to some sexual side effects with the Cymbalta, although it is better than previous medications he's tried  He doesn't want to take more medication but wants to fix this issue  He is also considering increasing his Concerta  He reports he still drinks   He comes home and has a few drinks and says only occasionally "gets hammered " he does not view this as an issue  He denies having a problem with alcohol  He is looking forward to getting his license back after his DUI last year, and will have to use a breathalyzer  PHQ-9 Depression Screening    PHQ-9:    Frequency of the following problems over the past two weeks:       Little interest or pleasure in doing things:  1 - several days  Feeling down, depressed, or hopeless:  1 - several days  Trouble falling or staying asleep, or sleeping too much:  1 - several days  Feeling tired or having little energy:  1 - several days  Poor appetite or overeatin - not at all  Feeling bad about yourself - or that you are a failure or have let yourself or your family down:  1 - several days  Trouble concentrating on things, such as reading the newspaper or watching television:  1 - several days  Moving or speaking so slowly that other people could have noticed  Or the opposite - being so fidgety or restless that you have been moving around a lot more than usual:  1 - several days  Thoughts that you would be better off dead, or of hurting yourself in some way:  0 - not at all  PHQ-2 Score:  2  PHQ-9 Score:  7       Review of Systems   Constitutional: Negative for fatigue and fever  HENT: Negative for sore throat  Eyes: Negative for visual disturbance  Respiratory: Negative for cough and shortness of breath  Cardiovascular: Negative for chest pain and palpitations  Gastrointestinal: Negative for diarrhea and nausea  Genitourinary: Negative for frequency  ED   Musculoskeletal: Negative for myalgias  Skin: Negative for rash  Psychiatric/Behavioral: Positive for dysphoric mood  The patient is nervous/anxious  All other systems reviewed and are negative      Medications:   Meds/Allergies   Current Outpatient Medications   Medication Sig Dispense Refill    DULoxetine (CYMBALTA) 60 mg delayed release capsule Take 1 capsule (60 mg total) by mouth daily 30 capsule 3    methylphenidate (CONCERTA) 36 MG ER tablet Take 1 tablet (36 mg total) by mouth every morningMax Daily Amount: 36 mg 30 tablet 0    naproxen (NAPROSYN) 500 mg tablet Take 1 tablet (500 mg total) by mouth 2 (two) times a day with meals 60 tablet 1    valACYclovir (VALTREX) 1,000 mg tablet Take 1 tablet (1,000 mg total) by mouth 2 (two) times a day for 10 days 20 tablet 3    buPROPion (WELLBUTRIN XL) 150 mg 24 hr tablet Take 1 tablet (150 mg total) by mouth every morning 30 tablet 5    sildenafil (VIAGRA) 25 MG tablet Take 1 tablet (25 mg total) by mouth daily as needed for erectile dysfunction 4 tablet 0     No current facility-administered medications for this visit  No Known Allergies    OBJECTIVE    Vitals:   Vitals:    12/13/19 0840   BP: 122/82   Pulse: 74   Temp: 98 1 °F (36 7 °C)   Weight: 83 5 kg (184 lb)   Height: 6' 1" (1 854 m)       Physical Exam   Constitutional: He appears well-developed and well-nourished  No distress  HENT:   Head: Normocephalic and atraumatic  Right Ear: External ear normal    Left Ear: External ear normal    Eyes: Conjunctivae and EOM are normal    Cardiovascular: Normal rate, regular rhythm, normal heart sounds and intact distal pulses  No murmur heard  Pulmonary/Chest: Effort normal and breath sounds normal  No respiratory distress  He has no wheezes  He has no rales  Abdominal: Soft  Lymphadenopathy:     He has no cervical adenopathy  Neurological: He is alert  No cranial nerve deficit  Skin: No rash noted  He is not diaphoretic  Psychiatric: He has a normal mood and affect  Nursing note and vitals reviewed

## 2020-01-09 DIAGNOSIS — F90.0 ATTENTION DEFICIT HYPERACTIVITY DISORDER (ADHD), PREDOMINANTLY INATTENTIVE TYPE: ICD-10-CM

## 2020-01-09 RX ORDER — METHYLPHENIDATE HYDROCHLORIDE 36 MG/1
36 TABLET ORAL EVERY MORNING
Qty: 30 TABLET | Refills: 0 | Status: SHIPPED | OUTPATIENT
Start: 2020-01-09 | End: 2020-02-04 | Stop reason: SDUPTHER

## 2020-01-21 ENCOUNTER — OFFICE VISIT (OUTPATIENT)
Dept: FAMILY MEDICINE CLINIC | Facility: CLINIC | Age: 40
End: 2020-01-21
Payer: COMMERCIAL

## 2020-01-21 VITALS
WEIGHT: 195 LBS | TEMPERATURE: 98 F | DIASTOLIC BLOOD PRESSURE: 70 MMHG | BODY MASS INDEX: 25.84 KG/M2 | HEIGHT: 73 IN | SYSTOLIC BLOOD PRESSURE: 112 MMHG | HEART RATE: 80 BPM

## 2020-01-21 DIAGNOSIS — F10.20 UNCOMPLICATED ALCOHOL DEPENDENCE (HCC): ICD-10-CM

## 2020-01-21 DIAGNOSIS — F32.4 MAJOR DEPRESSIVE DISORDER WITH SINGLE EPISODE, IN PARTIAL REMISSION (HCC): Primary | ICD-10-CM

## 2020-01-21 PROCEDURE — 99213 OFFICE O/P EST LOW 20 MIN: CPT | Performed by: FAMILY MEDICINE

## 2020-01-21 PROCEDURE — 1036F TOBACCO NON-USER: CPT | Performed by: FAMILY MEDICINE

## 2020-01-21 PROCEDURE — 3008F BODY MASS INDEX DOCD: CPT | Performed by: FAMILY MEDICINE

## 2020-01-21 NOTE — ASSESSMENT & PLAN NOTE
Pt with occasional use (<1-2x/month) and denies inebriation  Recommend full cessation   Continue to monitor

## 2020-01-21 NOTE — ASSESSMENT & PLAN NOTE
Mood stable  Worsening seems to be related to fluctuating work hours  Cont present meds  Urged alcohol cessation   Recheck 4m

## 2020-01-21 NOTE — PROGRESS NOTES
Assessment/Plan:    Major depressive disorder with single episode, in partial remission ([facility])  Mood stable  Worsening seems to be related to fluctuating work hours  Cont present meds  Urged alcohol cessation  Recheck 4m    Uncomplicated alcohol dependence ([facility] )  Pt with occasional use (<1-2x/month) and denies inebriation  Recommend full cessation  Continue to monitor       Diagnoses and all orders for this visit:    Major depressive disorder with single episode, in partial remission ([facility] )    Uncomplicated alcohol dependence ([facility] )          Subjective:      Patient ID: Morgan Dawson is a 44 y o  male  f/u multiple med issues  - mood sl labile  Has gotten his license back which has helped  Pt compliant with all meds which seems to help  May have occasional drink but denies inebriation  Sleep, interest and energy are stable  PHQ done  Pt works shift work which may play a role with occasional worsening of his mood  - no other concerns      The following portions of the patient's history were reviewed and updated as appropriate: He  has a past medical history of Bilateral carpal tunnel syndrome  He   Patient Active Problem List    Diagnosis Date Noted    Major depressive disorder with single episode, in partial remission ([place] [address] ) 00/93/5855    Uncomplicated alcohol dependence ([place] [address] ) 12/11/2018    Severe carpal tunnel syndrome, left 09/26/2016    Bilateral carpal tunnel syndrome 12/28/2015    Attention deficit hyperactivity disorder (ADHD) 09/24/2012     He  has a past surgical history that includes pr revise median n/carpal tunnel surg (Left, 10/25/2016); pr revise median n/carpal tunnel surg (Right, 3/22/2016); and Vasectomy  He  reports that he has quit smoking  He has never used smokeless tobacco  He reports that he drinks about 3 0 standard drinks of alcohol per week  He reports that he does not use drugs    Current Outpatient Medications   Medication Sig Dispense Refill    buPROPion (WELLBUTRIN XL) 150 mg 24 hr tablet Take 1 tablet (150 mg total) by mouth every morning 30 tablet 5    DULoxetine (CYMBALTA) 60 mg delayed release capsule Take 1 capsule (60 mg total) by mouth daily 30 capsule 3    methylphenidate (CONCERTA) 36 MG ER tablet Take 1 tablet (36 mg total) by mouth every morningMax Daily Amount: 36 mg 30 tablet 0    naproxen (NAPROSYN) 500 mg tablet Take 1 tablet (500 mg total) by mouth 2 (two) times a day with meals 60 tablet 1    sildenafil (VIAGRA) 25 MG tablet Take 1 tablet (25 mg total) by mouth daily as needed for erectile dysfunction 4 tablet 0    valACYclovir (VALTREX) 1,000 mg tablet Take 1 tablet (1,000 mg total) by mouth 2 (two) times a day for 10 days 20 tablet 3     No current facility-administered medications for this visit  He has No Known Allergies       Review of Systems   Constitutional: Positive for fatigue (intermittent)  HENT: Negative  Eyes: Negative  Respiratory: Negative  Cardiovascular: Negative  Gastrointestinal: Negative  Genitourinary: Negative  Musculoskeletal: Negative  Skin: Negative  Neurological: Negative  Psychiatric/Behavioral: Positive for dysphoric mood (occasional )  Negative for self-injury and suicidal ideas  Objective:      /70   Pulse 80   Temp 98 °F (36 7 °C)   Ht 6' 1" (1 854 m)   Wt 88 5 kg (195 lb)   BMI 25 73 kg/m²          Physical Exam   Constitutional: He is oriented to person, place, and time  He appears well-developed and well-nourished  HENT:   Head: Normocephalic and atraumatic  Right Ear: External ear normal    Left Ear: External ear normal    Nose: Nose normal    Mouth/Throat: Oropharynx is clear and moist    Eyes: Pupils are equal, round, and reactive to light  Conjunctivae and EOM are normal    Neck: Normal range of motion  No thyromegaly present  Cardiovascular: Normal rate, regular rhythm, normal heart sounds and intact distal pulses     Pulmonary/Chest: Effort normal and breath sounds normal    Lymphadenopathy:     He has no cervical adenopathy  Neurological: He is alert and oriented to person, place, and time  Skin: Skin is warm  Psychiatric: He has a normal mood and affect   His behavior is normal  Judgment and thought content normal    PHQ-9 = 4 (partial remission)

## 2020-02-04 DIAGNOSIS — F90.0 ATTENTION DEFICIT HYPERACTIVITY DISORDER (ADHD), PREDOMINANTLY INATTENTIVE TYPE: ICD-10-CM

## 2020-02-04 NOTE — TELEPHONE ENCOUNTER
Checked and ok in pdmp
Patient requesting refill sent to [id number]     Concerta 36 mg   # 30   1 once daily     Last OV 01/21/20, next OV 05/29/20
(2) probably inadequate

## 2020-02-05 RX ORDER — METHYLPHENIDATE HYDROCHLORIDE 36 MG/1
36 TABLET ORAL EVERY MORNING
Qty: 30 TABLET | Refills: 0 | Status: SHIPPED | OUTPATIENT
Start: 2020-02-05 | End: 2020-03-06 | Stop reason: SDUPTHER

## 2020-03-06 DIAGNOSIS — F90.0 ATTENTION DEFICIT HYPERACTIVITY DISORDER (ADHD), PREDOMINANTLY INATTENTIVE TYPE: ICD-10-CM

## 2020-03-06 RX ORDER — METHYLPHENIDATE HYDROCHLORIDE 36 MG/1
36 TABLET ORAL EVERY MORNING
Qty: 30 TABLET | Refills: 0 | Status: SHIPPED | OUTPATIENT
Start: 2020-03-06 | End: 2020-04-02 | Stop reason: SDUPTHER

## 2020-04-02 DIAGNOSIS — N52.2 DRUG-INDUCED ERECTILE DYSFUNCTION: ICD-10-CM

## 2020-04-02 DIAGNOSIS — F90.0 ATTENTION DEFICIT HYPERACTIVITY DISORDER (ADHD), PREDOMINANTLY INATTENTIVE TYPE: ICD-10-CM

## 2020-04-02 DIAGNOSIS — F33.0 MILD EPISODE OF RECURRENT MAJOR DEPRESSIVE DISORDER (HCC): ICD-10-CM

## 2020-04-02 RX ORDER — DULOXETIN HYDROCHLORIDE 60 MG/1
CAPSULE, DELAYED RELEASE ORAL
Qty: 30 CAPSULE | Refills: 3 | Status: SHIPPED | OUTPATIENT
Start: 2020-04-02 | End: 2020-08-30

## 2020-04-03 RX ORDER — METHYLPHENIDATE HYDROCHLORIDE 36 MG/1
36 TABLET ORAL EVERY MORNING
Qty: 30 TABLET | Refills: 0 | Status: SHIPPED | OUTPATIENT
Start: 2020-04-03 | End: 2020-05-29 | Stop reason: SDUPTHER

## 2020-04-03 RX ORDER — BUPROPION HYDROCHLORIDE 150 MG/1
150 TABLET ORAL EVERY MORNING
Qty: 30 TABLET | Refills: 5 | Status: SHIPPED | OUTPATIENT
Start: 2020-04-03 | End: 2020-06-01

## 2020-05-29 ENCOUNTER — OFFICE VISIT (OUTPATIENT)
Dept: FAMILY MEDICINE CLINIC | Facility: CLINIC | Age: 40
End: 2020-05-29
Payer: COMMERCIAL

## 2020-05-29 VITALS
TEMPERATURE: 97.9 F | WEIGHT: 192 LBS | HEIGHT: 73 IN | HEART RATE: 76 BPM | SYSTOLIC BLOOD PRESSURE: 120 MMHG | DIASTOLIC BLOOD PRESSURE: 76 MMHG | BODY MASS INDEX: 25.45 KG/M2

## 2020-05-29 DIAGNOSIS — F32.4 MAJOR DEPRESSIVE DISORDER WITH SINGLE EPISODE, IN PARTIAL REMISSION (HCC): ICD-10-CM

## 2020-05-29 DIAGNOSIS — F90.0 ATTENTION DEFICIT HYPERACTIVITY DISORDER (ADHD), PREDOMINANTLY INATTENTIVE TYPE: ICD-10-CM

## 2020-05-29 DIAGNOSIS — Z13.83 SCREENING FOR CARDIOVASCULAR, RESPIRATORY, AND GENITOURINARY DISEASES: ICD-10-CM

## 2020-05-29 DIAGNOSIS — Z00.00 ANNUAL PHYSICAL EXAM: Primary | ICD-10-CM

## 2020-05-29 DIAGNOSIS — Z13.6 SCREENING FOR CARDIOVASCULAR, RESPIRATORY, AND GENITOURINARY DISEASES: ICD-10-CM

## 2020-05-29 DIAGNOSIS — Z13.89 SCREENING FOR CARDIOVASCULAR, RESPIRATORY, AND GENITOURINARY DISEASES: ICD-10-CM

## 2020-05-29 DIAGNOSIS — N52.2 DRUG-INDUCED ERECTILE DYSFUNCTION: ICD-10-CM

## 2020-05-29 DIAGNOSIS — F10.20 UNCOMPLICATED ALCOHOL DEPENDENCE (HCC): ICD-10-CM

## 2020-05-29 PROCEDURE — 3008F BODY MASS INDEX DOCD: CPT | Performed by: FAMILY MEDICINE

## 2020-05-29 PROCEDURE — 99395 PREV VISIT EST AGE 18-39: CPT | Performed by: FAMILY MEDICINE

## 2020-05-29 RX ORDER — SILDENAFIL 50 MG/1
50 TABLET, FILM COATED ORAL DAILY PRN
Qty: 10 TABLET | Refills: 1 | Status: SHIPPED | OUTPATIENT
Start: 2020-05-29 | End: 2020-11-19

## 2020-05-29 RX ORDER — METHYLPHENIDATE HYDROCHLORIDE 36 MG/1
36 TABLET ORAL EVERY MORNING
Qty: 30 TABLET | Refills: 0 | Status: SHIPPED | OUTPATIENT
Start: 2020-05-29 | End: 2020-06-26

## 2020-05-30 DIAGNOSIS — N52.2 DRUG-INDUCED ERECTILE DYSFUNCTION: ICD-10-CM

## 2020-06-01 RX ORDER — BUPROPION HYDROCHLORIDE 150 MG/1
TABLET ORAL
Qty: 30 TABLET | Refills: 5 | Status: SHIPPED | OUTPATIENT
Start: 2020-06-01 | End: 2021-01-28 | Stop reason: ALTCHOICE

## 2020-06-26 DIAGNOSIS — F90.0 ATTENTION DEFICIT HYPERACTIVITY DISORDER (ADHD), PREDOMINANTLY INATTENTIVE TYPE: ICD-10-CM

## 2020-06-26 RX ORDER — METHYLPHENIDATE HYDROCHLORIDE 36 MG/1
TABLET ORAL
Qty: 30 TABLET | Refills: 0 | Status: SHIPPED | OUTPATIENT
Start: 2020-06-26 | End: 2020-08-02

## 2020-08-01 DIAGNOSIS — F90.0 ATTENTION DEFICIT HYPERACTIVITY DISORDER (ADHD), PREDOMINANTLY INATTENTIVE TYPE: ICD-10-CM

## 2020-08-02 RX ORDER — METHYLPHENIDATE HYDROCHLORIDE 36 MG/1
TABLET ORAL
Qty: 30 TABLET | Refills: 0 | Status: SHIPPED | OUTPATIENT
Start: 2020-08-02 | End: 2020-08-30

## 2020-08-29 DIAGNOSIS — F90.0 ATTENTION DEFICIT HYPERACTIVITY DISORDER (ADHD), PREDOMINANTLY INATTENTIVE TYPE: ICD-10-CM

## 2020-08-30 DIAGNOSIS — F33.0 MILD EPISODE OF RECURRENT MAJOR DEPRESSIVE DISORDER (HCC): ICD-10-CM

## 2020-08-30 RX ORDER — DULOXETIN HYDROCHLORIDE 60 MG/1
CAPSULE, DELAYED RELEASE ORAL
Qty: 30 CAPSULE | Refills: 3 | Status: SHIPPED | OUTPATIENT
Start: 2020-08-30 | End: 2021-01-22

## 2020-08-30 RX ORDER — METHYLPHENIDATE HYDROCHLORIDE 36 MG/1
TABLET ORAL
Qty: 30 TABLET | Refills: 0 | Status: SHIPPED | OUTPATIENT
Start: 2020-08-30 | End: 2020-10-21

## 2020-10-21 DIAGNOSIS — F90.0 ATTENTION DEFICIT HYPERACTIVITY DISORDER (ADHD), PREDOMINANTLY INATTENTIVE TYPE: ICD-10-CM

## 2020-10-21 RX ORDER — METHYLPHENIDATE HYDROCHLORIDE 36 MG/1
TABLET ORAL
Qty: 30 TABLET | Refills: 0 | Status: SHIPPED | OUTPATIENT
Start: 2020-10-21 | End: 2020-11-05

## 2020-11-03 ENCOUNTER — TELEPHONE (OUTPATIENT)
Dept: FAMILY MEDICINE CLINIC | Facility: CLINIC | Age: 40
End: 2020-11-03

## 2020-11-05 ENCOUNTER — OFFICE VISIT (OUTPATIENT)
Dept: FAMILY MEDICINE CLINIC | Facility: CLINIC | Age: 40
End: 2020-11-05
Payer: COMMERCIAL

## 2020-11-05 VITALS
HEIGHT: 73 IN | HEART RATE: 76 BPM | DIASTOLIC BLOOD PRESSURE: 80 MMHG | WEIGHT: 198 LBS | BODY MASS INDEX: 26.24 KG/M2 | TEMPERATURE: 98.4 F | SYSTOLIC BLOOD PRESSURE: 120 MMHG

## 2020-11-05 DIAGNOSIS — F90.0 ATTENTION DEFICIT HYPERACTIVITY DISORDER (ADHD), PREDOMINANTLY INATTENTIVE TYPE: ICD-10-CM

## 2020-11-05 DIAGNOSIS — F33.0 MILD EPISODE OF RECURRENT MAJOR DEPRESSIVE DISORDER (HCC): Primary | ICD-10-CM

## 2020-11-05 PROCEDURE — 1036F TOBACCO NON-USER: CPT | Performed by: FAMILY MEDICINE

## 2020-11-05 PROCEDURE — 99214 OFFICE O/P EST MOD 30 MIN: CPT | Performed by: FAMILY MEDICINE

## 2020-11-05 PROCEDURE — 3008F BODY MASS INDEX DOCD: CPT | Performed by: FAMILY MEDICINE

## 2020-11-05 PROCEDURE — 3725F SCREEN DEPRESSION PERFORMED: CPT | Performed by: FAMILY MEDICINE

## 2020-11-05 RX ORDER — METHYLPHENIDATE HYDROCHLORIDE 54 MG/1
54 TABLET ORAL DAILY
Qty: 30 TABLET | Refills: 0 | Status: SHIPPED | OUTPATIENT
Start: 2020-11-05 | End: 2020-12-09

## 2020-11-08 PROBLEM — F33.0 MILD EPISODE OF RECURRENT MAJOR DEPRESSIVE DISORDER (HCC): Status: ACTIVE | Noted: 2018-12-12

## 2020-11-18 DIAGNOSIS — N52.2 DRUG-INDUCED ERECTILE DYSFUNCTION: ICD-10-CM

## 2020-11-19 RX ORDER — SILDENAFIL 50 MG/1
TABLET, FILM COATED ORAL
Qty: 10 TABLET | Refills: 1 | Status: SHIPPED | OUTPATIENT
Start: 2020-11-19 | End: 2021-07-19

## 2020-12-01 ENCOUNTER — TELEPHONE (OUTPATIENT)
Dept: FAMILY MEDICINE CLINIC | Facility: CLINIC | Age: 40
End: 2020-12-01

## 2020-12-09 DIAGNOSIS — F90.0 ATTENTION DEFICIT HYPERACTIVITY DISORDER (ADHD), PREDOMINANTLY INATTENTIVE TYPE: ICD-10-CM

## 2020-12-09 RX ORDER — METHYLPHENIDATE HYDROCHLORIDE 54 MG/1
TABLET ORAL
Qty: 30 TABLET | Refills: 0 | Status: SHIPPED | OUTPATIENT
Start: 2020-12-09 | End: 2021-01-22

## 2021-01-21 DIAGNOSIS — F33.0 MILD EPISODE OF RECURRENT MAJOR DEPRESSIVE DISORDER (HCC): ICD-10-CM

## 2021-01-21 DIAGNOSIS — F90.0 ATTENTION DEFICIT HYPERACTIVITY DISORDER (ADHD), PREDOMINANTLY INATTENTIVE TYPE: ICD-10-CM

## 2021-01-22 RX ORDER — DULOXETIN HYDROCHLORIDE 60 MG/1
CAPSULE, DELAYED RELEASE ORAL
Qty: 30 CAPSULE | Refills: 3 | Status: SHIPPED | OUTPATIENT
Start: 2021-01-22 | End: 2021-06-01

## 2021-01-22 RX ORDER — METHYLPHENIDATE HYDROCHLORIDE 54 MG/1
TABLET ORAL
Qty: 30 TABLET | Refills: 0 | Status: SHIPPED | OUTPATIENT
Start: 2021-01-22 | End: 2021-02-23

## 2021-01-28 ENCOUNTER — OFFICE VISIT (OUTPATIENT)
Dept: FAMILY MEDICINE CLINIC | Facility: CLINIC | Age: 41
End: 2021-01-28
Payer: COMMERCIAL

## 2021-01-28 VITALS
HEIGHT: 73 IN | WEIGHT: 196 LBS | HEART RATE: 72 BPM | DIASTOLIC BLOOD PRESSURE: 80 MMHG | BODY MASS INDEX: 25.98 KG/M2 | SYSTOLIC BLOOD PRESSURE: 120 MMHG | TEMPERATURE: 98.6 F

## 2021-01-28 DIAGNOSIS — F90.0 ATTENTION DEFICIT HYPERACTIVITY DISORDER (ADHD), PREDOMINANTLY INATTENTIVE TYPE: ICD-10-CM

## 2021-01-28 DIAGNOSIS — F33.0 MILD EPISODE OF RECURRENT MAJOR DEPRESSIVE DISORDER (HCC): Primary | ICD-10-CM

## 2021-01-28 PROCEDURE — 99214 OFFICE O/P EST MOD 30 MIN: CPT | Performed by: FAMILY MEDICINE

## 2021-01-28 NOTE — PROGRESS NOTES
BMI Counseling: There is no height or weight on file to calculate BMI  The BMI is above normal  Nutrition recommendations include 3-5 servings of fruits/vegetables daily

## 2021-01-28 NOTE — PROGRESS NOTES
Assessment/Plan:    No problem-specific Assessment & Plan notes found for this encounter  There are no diagnoses linked to this encounter  Subjective:      Patient ID: Thierno Toth is a 36 y o  male  f/u multiple med issues  - pt still working shift work with frequent double shifts  Pt is being moved from his present position to strictly day shift  - still finds that his mood is a little labile, though he has not changed his job yet  - pt compliant with present meds  Concentration unchanged  - denies CP, palpitations, lightheadedness or other CV symptoms with or without exertion  - no new GI or  issues        The following portions of the patient's history were reviewed and updated as appropriate:   He  has a past medical history of Bilateral carpal tunnel syndrome  He   Patient Active Problem List    Diagnosis Date Noted    Mild episode of recurrent major depressive disorder ([facility] ) 67/28/2426    Uncomplicated alcohol dependence ([facility] ) 12/11/2018    Attention deficit hyperactivity disorder (ADHD) 09/24/2012     He  has a past surgical history that includes pr revise median n/carpal tunnel surg (Left, 10/25/2016); pr revise median n/carpal tunnel surg (Right, 3/22/2016); and Vasectomy  He  reports that he has quit smoking  He has never used smokeless tobacco  He reports current alcohol use of about 3 0 standard drinks of alcohol per week  He reports that he does not use drugs    Current Outpatient Medications   Medication Sig Dispense Refill    buPROPion (WELLBUTRIN XL) 150 mg 24 hr tablet TAKE 1 TABLET BY MOUTH EVERY MORNING 30 tablet 5    DULoxetine (CYMBALTA) 60 mg delayed release capsule TAKE 1 CAPSULE BY MOUTH EVERY DAY 30 capsule 3    methylphenidate (CONCERTA) 54 MG ER tablet TAKE 1 TABLET BY MOUTH EVERY DAY MAX DAILY DOSE OF 1 TABLET PER DAY 30 tablet 0    naproxen (NAPROSYN) 500 mg tablet Take 1 tablet (500 mg total) by mouth 2 (two) times a day with meals 60 tablet 1    sildenafil (VIAGRA) 50 MG tablet TAKE 1 TABLET BY MOUTH EVERY DAY AS NEEDED FOR ERECTILE DYSFUNCTION 10 tablet 1    valACYclovir (VALTREX) 1,000 mg tablet Take 1 tablet (1,000 mg total) by mouth 2 (two) times a day for 10 days 20 tablet 3     No current facility-administered medications for this visit  He has No Known Allergies       Review of Systems   Constitutional: Negative  HENT: Negative  Eyes: Negative  Respiratory: Negative  Cardiovascular: Negative  Gastrointestinal: Negative  Endocrine: Negative  Genitourinary: Negative  Musculoskeletal: Negative  Skin: Negative  Allergic/Immunologic: Negative  Neurological: Negative  Hematological: Negative  Psychiatric/Behavioral: Positive for dysphoric mood and sleep disturbance  Negative for self-injury and suicidal ideas  The patient is nervous/anxious  Objective:      /80   Pulse 72   Temp 98 6 °F (37 °C)   Ht 6' 1" (1 854 m)   Wt 88 9 kg (196 lb)   BMI 25 86 kg/m²          Physical Exam  Vitals signs reviewed  HENT:      Head: Normocephalic  Eyes:      Extraocular Movements: Extraocular movements intact  Conjunctiva/sclera: Conjunctivae normal       Pupils: Pupils are equal, round, and reactive to light  Neck:      Musculoskeletal: No muscular tenderness  Cardiovascular:      Rate and Rhythm: Normal rate and regular rhythm  Pulses: Normal pulses  Pulmonary:      Effort: Pulmonary effort is normal    Abdominal:      General: There is no distension  Palpations: Abdomen is soft  There is no mass  Tenderness: There is no abdominal tenderness  There is no right CVA tenderness or left CVA tenderness  Musculoskeletal: Normal range of motion  General: No swelling  Right lower leg: No edema  Left lower leg: No edema  Lymphadenopathy:      Cervical: No cervical adenopathy  Neurological:      General: No focal deficit present  Mental Status: He is alert  Psychiatric:         Behavior: Behavior normal          Thought Content: Thought content normal          Judgment: Judgment normal       Comments: PHQ-9 Depression Screening    PHQ-9:   Frequency of the following problems over the past two weeks:      Little interest or pleasure in doing things: 2 - more than half the days  Feeling down, depressed, or hopeless: 1 - several days  Trouble falling or staying asleep, or sleeping too much: 1 - several days  Feeling tired or having little energy: 1 - several days  Poor appetite or overeatin - not at all  Feeling bad about yourself - or that you are a failure or have let   yourself or your family down: 1 - several days  Trouble concentrating on things, such as reading the newspaper or watching   television: 1 - several days  Moving or speaking so slowly that other people could have noticed   Or the   opposite - being so fidgety or restless that you have been moving around a   lot more than usual: 0 - not at all  Thoughts that you would be better off dead, or of hurting yourself in some   way: 0 - not at all  PHQ-2 Score: 3  PHQ-9 Score: 7

## 2021-01-30 NOTE — ASSESSMENT & PLAN NOTE
Labile attention related to long shifts/lack of sleep  Pt feels that attention is stable on days he is rested  Will continue present meds   Monitor for now - recheck 3m

## 2021-01-30 NOTE — ASSESSMENT & PLAN NOTE
Relatively unchanged though pt is still working the same schedule, which has been his major stressor  Depression Screening Follow-up Plan: Patient's depression screening was positive with a PHQ-2 score of 3  Their PHQ-9 score was 7  Patient assessed for underlying major depression  They have no active suicidal ideations  Brief counseling provided and recommend additional follow-up/re-evaluation next office visit  Continue present meds   Recheck 3m - earlier if worse

## 2021-02-22 DIAGNOSIS — F90.0 ATTENTION DEFICIT HYPERACTIVITY DISORDER (ADHD), PREDOMINANTLY INATTENTIVE TYPE: ICD-10-CM

## 2021-02-23 RX ORDER — METHYLPHENIDATE HYDROCHLORIDE 54 MG/1
TABLET ORAL
Qty: 30 TABLET | Refills: 0 | Status: SHIPPED | OUTPATIENT
Start: 2021-02-23 | End: 2021-04-05

## 2021-04-05 DIAGNOSIS — F90.0 ATTENTION DEFICIT HYPERACTIVITY DISORDER (ADHD), PREDOMINANTLY INATTENTIVE TYPE: ICD-10-CM

## 2021-04-05 RX ORDER — METHYLPHENIDATE HYDROCHLORIDE 54 MG/1
TABLET ORAL
Qty: 30 TABLET | Refills: 0 | Status: SHIPPED | OUTPATIENT
Start: 2021-04-05 | End: 2021-05-06

## 2021-05-05 DIAGNOSIS — F90.0 ATTENTION DEFICIT HYPERACTIVITY DISORDER (ADHD), PREDOMINANTLY INATTENTIVE TYPE: ICD-10-CM

## 2021-05-06 RX ORDER — METHYLPHENIDATE HYDROCHLORIDE 54 MG/1
TABLET ORAL
Qty: 30 TABLET | Refills: 0 | Status: SHIPPED | OUTPATIENT
Start: 2021-05-06 | End: 2021-06-03

## 2021-06-01 DIAGNOSIS — F33.0 MILD EPISODE OF RECURRENT MAJOR DEPRESSIVE DISORDER (HCC): ICD-10-CM

## 2021-06-01 RX ORDER — DULOXETIN HYDROCHLORIDE 60 MG/1
CAPSULE, DELAYED RELEASE ORAL
Qty: 30 CAPSULE | Refills: 3 | Status: SHIPPED | OUTPATIENT
Start: 2021-06-01 | End: 2021-09-30

## 2021-06-03 DIAGNOSIS — F90.0 ATTENTION DEFICIT HYPERACTIVITY DISORDER (ADHD), PREDOMINANTLY INATTENTIVE TYPE: ICD-10-CM

## 2021-06-03 RX ORDER — METHYLPHENIDATE HYDROCHLORIDE 54 MG/1
TABLET ORAL
Qty: 30 TABLET | Refills: 0 | Status: SHIPPED | OUTPATIENT
Start: 2021-06-03 | End: 2021-06-29

## 2021-06-22 ENCOUNTER — OFFICE VISIT (OUTPATIENT)
Dept: FAMILY MEDICINE CLINIC | Facility: CLINIC | Age: 41
End: 2021-06-22
Payer: COMMERCIAL

## 2021-06-22 VITALS
HEIGHT: 73 IN | WEIGHT: 196 LBS | TEMPERATURE: 98.4 F | BODY MASS INDEX: 25.98 KG/M2 | SYSTOLIC BLOOD PRESSURE: 122 MMHG | HEART RATE: 76 BPM | DIASTOLIC BLOOD PRESSURE: 74 MMHG

## 2021-06-22 DIAGNOSIS — F33.0 MILD EPISODE OF RECURRENT MAJOR DEPRESSIVE DISORDER (HCC): Primary | ICD-10-CM

## 2021-06-22 DIAGNOSIS — F90.0 ATTENTION DEFICIT HYPERACTIVITY DISORDER (ADHD), PREDOMINANTLY INATTENTIVE TYPE: ICD-10-CM

## 2021-06-22 DIAGNOSIS — Z13.83 SCREENING FOR CARDIOVASCULAR, RESPIRATORY, AND GENITOURINARY DISEASES: ICD-10-CM

## 2021-06-22 DIAGNOSIS — Z13.89 SCREENING FOR CARDIOVASCULAR, RESPIRATORY, AND GENITOURINARY DISEASES: ICD-10-CM

## 2021-06-22 DIAGNOSIS — Z13.6 SCREENING FOR CARDIOVASCULAR, RESPIRATORY, AND GENITOURINARY DISEASES: ICD-10-CM

## 2021-06-22 PROCEDURE — 99214 OFFICE O/P EST MOD 30 MIN: CPT | Performed by: FAMILY MEDICINE

## 2021-06-22 PROCEDURE — 1036F TOBACCO NON-USER: CPT | Performed by: FAMILY MEDICINE

## 2021-06-22 PROCEDURE — 3725F SCREEN DEPRESSION PERFORMED: CPT | Performed by: FAMILY MEDICINE

## 2021-06-22 PROCEDURE — 3008F BODY MASS INDEX DOCD: CPT | Performed by: FAMILY MEDICINE

## 2021-06-22 NOTE — PROGRESS NOTES
Assessment/Plan:    Attention deficit hyperactivity disorder (ADHD)  Stable  Continue present meds  Recheck 6m    Mild episode of recurrent major depressive disorder ([facility] )  Depression Screening Follow-up Plan: Patient's depression screening was positive with a PHQ-2 score of 2  Their PHQ-9 score was 6  Patient assessed for underlying major depression  They have no active suicidal ideations  Brief counseling provided and recommend additional follow-up/re-evaluation next office visit  Recheck 6m - earlier if worse         Diagnoses and all orders for this visit:    Mild episode of recurrent major depressive disorder ([facility] )  -     CBC and differential; Future  -     Comprehensive metabolic panel; Future  -     TSH, 3rd generation with Free T4 reflex; Future    Attention deficit hyperactivity disorder (ADHD), predominantly inattentive type  -     CBC and differential; Future  -     Comprehensive metabolic panel; Future  -     TSH, 3rd generation with Free T4 reflex; Future    Screening for cardiovascular, respiratory, and genitourinary diseases  -     Comprehensive metabolic panel; Future  -     Lipid panel; Future          Subjective:      Patient ID: Loli Vizcarra is a 36 y o  male  f/u multiple med issues  - pt doing well  - pt did not change his shift at work, but found that he is not working as many double shifts/overtime  and finds that he is tolerating it better  - mood is a little labil  "I still feel a little blah" at times      - concentration stable  Still with occasional problems - usually when tired   - denies CP, palpitations, lightheadedness or other CV symptoms with or without exertion  - no new GI or  issues        The following portions of the patient's history were reviewed and updated as appropriate:   He  has a past medical history of Bilateral carpal tunnel syndrome    He   Patient Active Problem List    Diagnosis Date Noted    Mild episode of recurrent major depressive disorder ([facility] ) 36/71/0917    Uncomplicated alcohol dependence ([facility] ) 12/11/2018    Attention deficit hyperactivity disorder (ADHD) 09/24/2012     He  has a past surgical history that includes pr revise median n/carpal tunnel surg (Left, 10/25/2016); pr revise median n/carpal tunnel surg (Right, 3/22/2016); and Vasectomy  He  reports that he has quit smoking  He has never used smokeless tobacco  He reports current alcohol use of about 3 0 standard drinks of alcohol per week  He reports that he does not use drugs  Current Outpatient Medications   Medication Sig Dispense Refill    DULoxetine (CYMBALTA) 60 mg delayed release capsule TAKE 1 CAPSULE BY MOUTH EVERY DAY 30 capsule 3    methylphenidate (CONCERTA) 54 MG ER tablet TAKE 1 TABLET BY MOUTH EVERY DAY MAXIMUM DAILY DOSE: 1 TABLET 30 tablet 0    naproxen (NAPROSYN) 500 mg tablet Take 1 tablet (500 mg total) by mouth 2 (two) times a day with meals 60 tablet 1    sildenafil (VIAGRA) 50 MG tablet TAKE 1 TABLET BY MOUTH EVERY DAY AS NEEDED FOR ERECTILE DYSFUNCTION 10 tablet 1    valACYclovir (VALTREX) 1,000 mg tablet Take 1 tablet (1,000 mg total) by mouth 2 (two) times a day for 10 days 20 tablet 3     No current facility-administered medications for this visit  He has No Known Allergies       Review of Systems   Constitutional: Positive for fatigue  HENT: Negative  Eyes: Negative  Respiratory: Negative  Cardiovascular: Negative  Gastrointestinal: Negative  Endocrine: Negative  Genitourinary: Negative  Musculoskeletal: Negative  Skin: Negative  Allergic/Immunologic: Negative  Neurological: Negative  Hematological: Negative  Psychiatric/Behavioral: Positive for dysphoric mood  Negative for self-injury, sleep disturbance and suicidal ideas  The patient is not nervous/anxious            Objective:      /74   Pulse 76   Temp 98 4 °F (36 9 °C)   Ht 6' 1" (1 854 m)   Wt 88 9 kg (196 lb)   BMI 25 86 kg/m²          Physical Exam  Vitals reviewed  Constitutional:       Appearance: He is well-developed  HENT:      Head: Normocephalic and atraumatic  Right Ear: Tympanic membrane, ear canal and external ear normal       Left Ear: Tympanic membrane, ear canal and external ear normal    Eyes:      General: No scleral icterus  Extraocular Movements: Extraocular movements intact  Conjunctiva/sclera: Conjunctivae normal       Pupils: Pupils are equal, round, and reactive to light  Neck:      Thyroid: No thyromegaly  Vascular: No carotid bruit  Cardiovascular:      Rate and Rhythm: Normal rate and regular rhythm  Pulses: Normal pulses  Heart sounds: Normal heart sounds  No murmur heard  Pulmonary:      Effort: Pulmonary effort is normal       Breath sounds: Normal breath sounds  Abdominal:      General: Bowel sounds are normal  There is no distension  Palpations: Abdomen is soft  There is no mass  Tenderness: There is no abdominal tenderness  Musculoskeletal:         General: No swelling, tenderness or deformity  Normal range of motion  Cervical back: Normal range of motion and neck supple  No muscular tenderness  Right lower leg: No edema  Left lower leg: No edema  Lymphadenopathy:      Cervical: No cervical adenopathy  Skin:     General: Skin is warm and dry  Capillary Refill: Capillary refill takes less than 2 seconds  Neurological:      Mental Status: He is alert and oriented to person, place, and time  Cranial Nerves: No cranial nerve deficit  Sensory: No sensory deficit  Motor: No weakness  Psychiatric:         Mood and Affect: Mood normal          Behavior: Behavior normal          Thought Content:  Thought content normal          Judgment: Judgment normal       Comments: PHQ-9 Depression Screening    PHQ-9:   Frequency of the following problems over the past two weeks:      Little interest or pleasure in doing things: 1 - several days  Feeling down, depressed, or hopeless: 1 - several days  Trouble falling or staying asleep, or sleeping too much: 1 - several days  Feeling tired or having little energy: 1 - several days  Poor appetite or overeatin - not at all  Feeling bad about yourself - or that you are a failure or have let   yourself or your family down: 1 - several days  Trouble concentrating on things, such as reading the newspaper or watching   television: 1 - several days  Moving or speaking so slowly that other people could have noticed   Or the   opposite - being so fidgety or restless that you have been moving around a   lot more than usual: 0 - not at all  Thoughts that you would be better off dead, or of hurting yourself in some   way: 0 - not at all  PHQ-2 Score: 2  PHQ-9 Score: 6

## 2021-06-22 NOTE — ASSESSMENT & PLAN NOTE
Depression Screening Follow-up Plan: Patient's depression screening was positive with a PHQ-2 score of 2  Their PHQ-9 score was 6  Patient assessed for underlying major depression  They have no active suicidal ideations  Brief counseling provided and recommend additional follow-up/re-evaluation next office visit    Recheck 6m - earlier if worse

## 2021-06-29 DIAGNOSIS — F90.0 ATTENTION DEFICIT HYPERACTIVITY DISORDER (ADHD), PREDOMINANTLY INATTENTIVE TYPE: ICD-10-CM

## 2021-06-29 RX ORDER — METHYLPHENIDATE HYDROCHLORIDE 54 MG/1
TABLET ORAL
Qty: 30 TABLET | Refills: 0 | Status: SHIPPED | OUTPATIENT
Start: 2021-06-29 | End: 2021-07-26 | Stop reason: SDUPTHER

## 2021-07-19 DIAGNOSIS — N52.2 DRUG-INDUCED ERECTILE DYSFUNCTION: ICD-10-CM

## 2021-07-19 RX ORDER — SILDENAFIL 50 MG/1
TABLET, FILM COATED ORAL
Qty: 10 TABLET | Refills: 1 | Status: SHIPPED | OUTPATIENT
Start: 2021-07-19 | End: 2021-10-12

## 2021-07-26 DIAGNOSIS — F90.0 ATTENTION DEFICIT HYPERACTIVITY DISORDER (ADHD), PREDOMINANTLY INATTENTIVE TYPE: ICD-10-CM

## 2021-07-26 RX ORDER — METHYLPHENIDATE HYDROCHLORIDE 54 MG/1
54 TABLET ORAL DAILY
Qty: 30 TABLET | Refills: 0 | Status: SHIPPED | OUTPATIENT
Start: 2021-07-28 | End: 2021-08-30 | Stop reason: SDUPTHER

## 2021-07-26 NOTE — TELEPHONE ENCOUNTER
Per PDMP last fill 06/29/21   Last OV 06/22/21, Next OV 12/30/21 06/29/2021 1 06/29/2021   METHYLPHENIDATE ER 54 MG TAB  30 0 30 [facility]   [id number]  WEGMA ([id number]) 0  [place] Ins [place]    06/03/2021 1 06/03/2021   METHYLPHENIDATE ER 54 MG TAB  30 0 30 [facility]   [id number]  WEGMA ([id number]) 0  [place]    05/06/2021 1 05/06/2021   METHYLPHENIDATE ER 54 MG TAB  30 0 30 [facility]   [id number]  WEGMA ([id number]) 0  [place]

## 2021-08-23 DIAGNOSIS — B00.9 HSV (HERPES SIMPLEX VIRUS) INFECTION: ICD-10-CM

## 2021-08-24 RX ORDER — VALACYCLOVIR HYDROCHLORIDE 1 G/1
1000 TABLET, FILM COATED ORAL 2 TIMES DAILY
Qty: 20 TABLET | Refills: 0 | Status: SHIPPED | OUTPATIENT
Start: 2021-08-24 | End: 2021-09-03

## 2021-08-30 DIAGNOSIS — F90.0 ATTENTION DEFICIT HYPERACTIVITY DISORDER (ADHD), PREDOMINANTLY INATTENTIVE TYPE: ICD-10-CM

## 2021-08-30 RX ORDER — METHYLPHENIDATE HYDROCHLORIDE 54 MG/1
54 TABLET ORAL DAILY
Qty: 30 TABLET | Refills: 0 | Status: SHIPPED | OUTPATIENT
Start: 2021-08-30 | End: 2021-09-30

## 2021-08-30 NOTE — TELEPHONE ENCOUNTER
Per PDMP last fill 07/28/21   Last OV 06/22/21, Next OV 12/30/21 07/28/2021 1 07/26/2021   METHYLPHENIDATE ER 54 MG TAB  30 0 30 [facility]   [id number]  WEGMA ([id number]) 0  [place]    06/29/2021 1 06/29/2021   METHYLPHENIDATE ER 54 MG TAB  30 0 30 [facility]   [id number]  WEGMA ([id number]) 0  [place]    06/03/2021 1 06/03/2021   METHYLPHENIDATE ER 54 MG TAB  30 0 30 [facility]   [id number]  WEGMA ([id number]) 0  [place] Ins [place]

## 2021-09-30 DIAGNOSIS — F90.0 ATTENTION DEFICIT HYPERACTIVITY DISORDER (ADHD), PREDOMINANTLY INATTENTIVE TYPE: ICD-10-CM

## 2021-09-30 DIAGNOSIS — F33.0 MILD EPISODE OF RECURRENT MAJOR DEPRESSIVE DISORDER (HCC): ICD-10-CM

## 2021-09-30 RX ORDER — DULOXETIN HYDROCHLORIDE 60 MG/1
CAPSULE, DELAYED RELEASE ORAL
Qty: 30 CAPSULE | Refills: 3 | Status: SHIPPED | OUTPATIENT
Start: 2021-09-30 | End: 2022-01-28 | Stop reason: SDUPTHER

## 2021-09-30 RX ORDER — METHYLPHENIDATE HYDROCHLORIDE 54 MG/1
TABLET ORAL
Qty: 30 TABLET | Refills: 0 | Status: SHIPPED | OUTPATIENT
Start: 2021-09-30 | End: 2021-11-02

## 2021-10-11 DIAGNOSIS — N52.2 DRUG-INDUCED ERECTILE DYSFUNCTION: ICD-10-CM

## 2021-10-12 RX ORDER — SILDENAFIL 50 MG/1
TABLET, FILM COATED ORAL
Qty: 10 TABLET | Refills: 1 | Status: SHIPPED | OUTPATIENT
Start: 2021-10-12 | End: 2022-01-04

## 2021-12-02 DIAGNOSIS — F90.0 ATTENTION DEFICIT HYPERACTIVITY DISORDER (ADHD), PREDOMINANTLY INATTENTIVE TYPE: ICD-10-CM

## 2021-12-02 RX ORDER — METHYLPHENIDATE HYDROCHLORIDE 54 MG/1
TABLET ORAL
Qty: 30 TABLET | Refills: 0 | Status: SHIPPED | OUTPATIENT
Start: 2021-12-02 | End: 2022-01-04

## 2021-12-23 ENCOUNTER — RA CDI HCC (OUTPATIENT)
Dept: OTHER | Facility: HOSPITAL | Age: 41
End: 2021-12-23

## 2021-12-31 DIAGNOSIS — N52.2 DRUG-INDUCED ERECTILE DYSFUNCTION: ICD-10-CM

## 2021-12-31 DIAGNOSIS — F90.0 ATTENTION DEFICIT HYPERACTIVITY DISORDER (ADHD), PREDOMINANTLY INATTENTIVE TYPE: ICD-10-CM

## 2022-01-03 NOTE — TELEPHONE ENCOUNTER
12/02/2021  3  12/02/2021  METHYLPHENIDATE ER 54 MG TAB  30 0  30  [facility]  [id number]  WEGMA ([id number])  0   Comm Ins  [place]    11/02/2021  2  11/02/2021  METHYLPHENIDATE ER 54 MG TAB  10 0  10  [facility]  [id number]   P   WEGMA ([id number])  0   [place] Ins  [place]    11/02/2021  3  11/02/2021  METHYLPHENIDATE ER 54 MG TAB  20 0  20  [facility]  [id number]   P   WEGMA ([id number])  0   Comm Ins  [place]

## 2022-01-04 RX ORDER — SILDENAFIL 50 MG/1
TABLET, FILM COATED ORAL
Qty: 10 TABLET | Refills: 1 | Status: SHIPPED | OUTPATIENT
Start: 2022-01-04 | End: 2022-01-28 | Stop reason: SDUPTHER

## 2022-01-04 RX ORDER — METHYLPHENIDATE HYDROCHLORIDE 54 MG/1
TABLET ORAL
Qty: 30 TABLET | Refills: 0 | Status: SHIPPED | OUTPATIENT
Start: 2022-01-04 | End: 2022-01-28 | Stop reason: SDUPTHER

## 2022-01-23 ENCOUNTER — APPOINTMENT (OUTPATIENT)
Dept: RADIOLOGY | Facility: MEDICAL CENTER | Age: 42
End: 2022-01-23
Payer: COMMERCIAL

## 2022-01-23 ENCOUNTER — OFFICE VISIT (OUTPATIENT)
Dept: URGENT CARE | Facility: MEDICAL CENTER | Age: 42
End: 2022-01-23
Payer: COMMERCIAL

## 2022-01-23 VITALS
SYSTOLIC BLOOD PRESSURE: 138 MMHG | OXYGEN SATURATION: 96 % | HEIGHT: 73 IN | TEMPERATURE: 98 F | BODY MASS INDEX: 27.3 KG/M2 | WEIGHT: 206 LBS | RESPIRATION RATE: 18 BRPM | DIASTOLIC BLOOD PRESSURE: 85 MMHG | HEART RATE: 98 BPM

## 2022-01-23 DIAGNOSIS — S43.401A SPRAIN OF RIGHT SHOULDER, UNSPECIFIED SHOULDER SPRAIN TYPE, INITIAL ENCOUNTER: ICD-10-CM

## 2022-01-23 DIAGNOSIS — S43.401A SPRAIN OF RIGHT SHOULDER, UNSPECIFIED SHOULDER SPRAIN TYPE, INITIAL ENCOUNTER: Primary | ICD-10-CM

## 2022-01-23 PROCEDURE — 73030 X-RAY EXAM OF SHOULDER: CPT

## 2022-01-23 PROCEDURE — 99213 OFFICE O/P EST LOW 20 MIN: CPT

## 2022-01-23 RX ORDER — METHOCARBAMOL 500 MG/1
500 TABLET, FILM COATED ORAL 4 TIMES DAILY
Qty: 20 TABLET | Refills: 0 | Status: SHIPPED | OUTPATIENT
Start: 2022-01-23 | End: 2022-01-28

## 2022-01-23 NOTE — PATIENT INSTRUCTIONS
Shoulder sprain  Rest, ice, elevate  Follow up with PCP in 3-5 days  Proceed to  ER if symptoms worsen  Shoulder Sprain   WHAT YOU NEED TO KNOW:   A shoulder sprain happens when a ligament in your shoulder is stretched or torn  Ligaments are the tough tissues that connect bones  Ligaments allow you to lift, lower, and rotate your arm  DISCHARGE INSTRUCTIONS:   Return to the emergency department if:   · You feel severe pain in your shoulder when you move it, or it is touched  · Your skin feels cold or clammy  · You have numbness, tingling, or a feeling of pins and needles in your shoulder  · The skin on your injured shoulder looks blue or pale  Call your doctor if:   · You have new or increased swelling and pain in your shoulder  · You have new or increased stiffness when you move your injured shoulder  · Your symptoms do not improve within 5 to 7 days  · You have questions or concerns about your condition or care  Medicines: You may need any of the following:  · Acetaminophen  decreases pain and fever  It is available without a doctor's order  Ask how much to take and how often to take it  Follow directions  Read the labels of all other medicines you are using to see if they also contain acetaminophen, or ask your doctor or pharmacist  Acetaminophen can cause liver damage if not taken correctly  Do not use more than 4 grams (4,000 milligrams) total of acetaminophen in one day  · NSAIDs , such as ibuprofen, help decrease swelling, pain, and fever  This medicine is available with or without a doctor's order  NSAIDs can cause stomach bleeding or kidney problems in certain people  If you take blood thinner medicine, always ask your healthcare provider if NSAIDs are safe for you  Always read the medicine label and follow directions  · Prescription pain medicine  may be given  Ask your healthcare provider how to take this medicine safely   Some prescription pain medicines contain acetaminophen  Do not take other medicines that contain acetaminophen without talking to your healthcare provider  Too much acetaminophen may cause liver damage  Prescription pain medicine may cause constipation  Ask your healthcare provider how to prevent or treat constipation  · Take your medicine as directed  Contact your healthcare provider if you think your medicine is not helping or if you have side effects  Tell him or her if you are allergic to any medicine  Keep a list of the medicines, vitamins, and herbs you take  Include the amounts, and when and why you take them  Bring the list or the pill bottles to follow-up visits  Carry your medicine list with you in case of an emergency  Follow up with your doctor as directed:  Write down your questions so you remember to ask them during your visits  Self-care:   · Rest  your shoulder so it can heal  Avoid moving your shoulder as your injury heals  This will help decrease the risk of more damage to your shoulder  · Apply ice  on your shoulder for 20 to 30 minutes every 2 hours or as directed  Use an ice pack, or put crushed ice in a plastic bag  Cover it with a towel before you apply it to your shoulder  Ice helps prevent tissue damage and decreases swelling and pain  · Compress your shoulder as directed  Compression provides support and helps decrease swelling and movement so your shoulder can heal  For mild sprains, you may be given a sling to support your arm  You may need a padded brace or a plaster cast to hold your shoulder in place if the sprain is more serious  How to wear a brace, sling, or splint:  A brace, sling, or splint may be needed to limit your movement and protect your injured shoulder  · Wear your brace, sling, or splint as directed  You may need to wear it all the time and take it off only to bathe or do exercises  Ask your healthcare provider how long you should wear it  · Keep your skin clean and dry    Padding under your armpit will help absorb sweat and prevent sores on your skin  · Do not hunch your shoulders  This may cause pain  Keep your shoulders relaxed  · Position the sling over your arm and hand so that it also covers your knuckles  This will help the sling support your wrist and hand  Position your wrist higher than your elbow  Your wrist may start to hurt or go numb if your sling is too short  Physical therapy:  A physical therapist teaches you exercises to help improve movement and strength, and to decrease pain  Prevent another injury:   · Do not exercise when you are tired or in pain  Warm up and stretch before you exercise  · Wear equipment to protect yourself when you play sports  · Wear shoes that fit well and run on flat surfaces to prevent falls  © Copyright [employer] 2021 Information is for End User's use only and may not be sold, redistributed or otherwise used for commercial purposes  All illustrations and images included in CareNotes® are the copyrighted property of A D A M , Inc  or [facility] Irene Shanks   The above information is an  only  It is not intended as medical advice for individual conditions or treatments  Talk to your doctor, nurse or pharmacist before following any medical regimen to see if it is safe and effective for you

## 2022-01-23 NOTE — LETTER
January 23, 2022     Patient: Josefa Lakhani   YOB: 1980   Date of Visit: 1/23/2022       To Whom it May Concern:    Josefa Lakhani was seen in my clinic on 1/23/2022  He may return to work on 1/27/22  If you have any questions or concerns, please don't hesitate to call           Sincerely,          [facility] [place]        CC: Josefa Lakhani

## 2022-01-28 ENCOUNTER — OFFICE VISIT (OUTPATIENT)
Dept: FAMILY MEDICINE CLINIC | Facility: CLINIC | Age: 42
End: 2022-01-28
Payer: COMMERCIAL

## 2022-01-28 VITALS
HEART RATE: 87 BPM | SYSTOLIC BLOOD PRESSURE: 118 MMHG | HEIGHT: 73 IN | TEMPERATURE: 98.3 F | OXYGEN SATURATION: 99 % | WEIGHT: 202.2 LBS | BODY MASS INDEX: 26.8 KG/M2 | DIASTOLIC BLOOD PRESSURE: 90 MMHG

## 2022-01-28 DIAGNOSIS — Z00.00 ANNUAL PHYSICAL EXAM: Primary | ICD-10-CM

## 2022-01-28 DIAGNOSIS — F90.0 ATTENTION DEFICIT HYPERACTIVITY DISORDER (ADHD), PREDOMINANTLY INATTENTIVE TYPE: ICD-10-CM

## 2022-01-28 DIAGNOSIS — F33.0 MILD EPISODE OF RECURRENT MAJOR DEPRESSIVE DISORDER (HCC): ICD-10-CM

## 2022-01-28 DIAGNOSIS — N52.2 DRUG-INDUCED ERECTILE DYSFUNCTION: ICD-10-CM

## 2022-01-28 PROBLEM — F33.41 RECURRENT MAJOR DEPRESSIVE DISORDER, IN PARTIAL REMISSION (HCC): Status: ACTIVE | Noted: 2018-12-12

## 2022-01-28 PROCEDURE — 99396 PREV VISIT EST AGE 40-64: CPT | Performed by: FAMILY MEDICINE

## 2022-01-28 PROCEDURE — 1036F TOBACCO NON-USER: CPT | Performed by: FAMILY MEDICINE

## 2022-01-28 PROCEDURE — 3008F BODY MASS INDEX DOCD: CPT | Performed by: FAMILY MEDICINE

## 2022-01-28 PROCEDURE — 3725F SCREEN DEPRESSION PERFORMED: CPT | Performed by: FAMILY MEDICINE

## 2022-01-28 RX ORDER — METHYLPHENIDATE HYDROCHLORIDE 54 MG/1
54 TABLET ORAL DAILY
Qty: 30 TABLET | Refills: 0 | Status: SHIPPED | OUTPATIENT
Start: 2022-01-28 | End: 2022-03-07

## 2022-01-28 RX ORDER — SILDENAFIL 50 MG/1
50 TABLET, FILM COATED ORAL AS NEEDED
Qty: 10 TABLET | Refills: 1 | Status: SHIPPED | OUTPATIENT
Start: 2022-01-28 | End: 2022-04-04

## 2022-01-28 RX ORDER — DULOXETIN HYDROCHLORIDE 60 MG/1
60 CAPSULE, DELAYED RELEASE ORAL DAILY
Qty: 30 CAPSULE | Refills: 5 | Status: SHIPPED | OUTPATIENT
Start: 2022-01-28 | End: 2022-07-25

## 2022-01-28 NOTE — PATIENT INSTRUCTIONS

## 2022-01-28 NOTE — PROGRESS NOTES
ADULT ANNUAL [address]    NAME: Jorge Orozco  AGE: 39 y o  SEX: male  : 1980     DATE: 2022     Assessment and Plan:     Problem List Items Addressed This Visit        Other    Attention deficit hyperactivity disorder (ADHD)     Stable on present dose  Continue Concerta 54DM qd  Recheck 6m         Relevant Medications    DULoxetine (CYMBALTA) 60 mg delayed release capsule    methylphenidate (CONCERTA) 54 MG ER tablet    Recurrent major depressive disorder, in partial remission ([facility] )     PHQ-2/9 Depression Screening    Little interest or pleasure in doing things: 0 - not at all  Feeling down, depressed, or hopeless: 1 - several days  Trouble falling or staying asleep, or sleeping too much: 0 - not at all  Feeling tired or having little energy: 1 - several days  Poor appetite or overeatin - not at all  Feeling bad about yourself - or that you are a failure or have let yourself or your family down: 0 - not at all  Trouble concentrating on things, such as reading the newspaper or watching television: 1 - several days  Moving or speaking so slowly that other people could have noticed  Or the opposite - being so fidgety or restless that you have been moving around a lot more than usual: 0 - not at all  Thoughts that you would be better off dead, or of hurting yourself in some way: 0 - not at all  PHQ-9 Score: 3   PHQ-9 Interpretation: No or Minimal depression      Stable on duloxetine  Continue present care  Recheck 6m           Relevant Medications    DULoxetine (CYMBALTA) 60 mg delayed release capsule    methylphenidate (CONCERTA) 54 MG ER tablet      Other Visit Diagnoses     Annual physical exam    -  Primary    Drug-induced erectile dysfunction        Relevant Medications    sildenafil (VIAGRA) 50 MG tablet          Immunizations and preventive care screenings were discussed with patient today   Appropriate education was printed on patient's after visit summary  Counseling:  · Exercise: the importance of regular exercise/physical activity was discussed  Recommend exercise 3-5 times per week for at least 30 minutes  · BMI Counseling: Body mass index is 26 68 kg/m²  The BMI is above normal  Nutrition recommendations include reducing portion sizes, moderation in carbohydrate intake, increasing intake of lean protein, reducing intake of saturated fat and trans fat and reducing intake of cholesterol  Return in about 6 months (around 2022)  Chief Complaint:     Chief Complaint   Patient presents with    Follow-up      History of Present Illness:     Adult Annual Physical   Patient here for a comprehensive physical exam  The patient reports problems - as below  - still drinking, but not every day  Still drinks 10-12 beer/week  Pt denies daily drinking or drinking to intoxication      Diet and Physical Activity  · Diet/Nutrition: well balanced diet  · Exercise: moderate cardiovascular exercise, 1-2 times a week on average and 30-60 minutes on average  Depression Screening  PHQ-2/9 Depression Screening    Little interest or pleasure in doing things: 0 - not at all  Feeling down, depressed, or hopeless: 1 - several days  Trouble falling or staying asleep, or sleeping too much: 0 - not at all  Feeling tired or having little energy: 1 - several days  Poor appetite or overeatin - not at all  Feeling bad about yourself - or that you are a failure or have let yourself or your family down: 0 - not at all  Trouble concentrating on things, such as reading the newspaper or watching television: 1 - several days  Moving or speaking so slowly that other people could have noticed   Or the opposite - being so fidgety or restless that you have been moving around a lot more than usual: 0 - not at all  Thoughts that you would be better off dead, or of hurting yourself in some way: 0 - not at all  PHQ-9 Score: 3   PHQ-9 Interpretation: No or Minimal depression        General Health  · Sleep: sleeps well and gets 7-8 hours of sleep on average  · Hearing: normal - bilateral   · Vision: no vision problems  · Dental: regular dental visits and brushes teeth twice daily   Health  · Symptoms include: none     Review of Systems:     Review of Systems   Past Medical History:     Past Medical History:   Diagnosis Date    Bilateral carpal tunnel syndrome     Last Assessed 12/23/2015      Past Surgical History:     Past Surgical History:   Procedure Laterality Date    AK REVISE MEDIAN N/CARPAL TUNNEL SURG Left 10/25/2016    Procedure: CARPAL TUNNEL RELEASE ;  Surgeon: Susie Huffman DO;  [facility];  Service: Orthopedics    AK REVISE MEDIAN N/CARPAL TUNNEL SURG Right 3/22/2016    Procedure: CARPAL TUNNEL RELEASE ;  Surgeon: Susie Huffman DO;  [facility];  Service: Orthopedics    VASECTOMY      Vas Deferens      Family History:     Family History   Problem Relation Age of Onset    Hypertension Father         Essential      Social History:     Social History     Socioeconomic History    Marital status: Single     Spouse name: None    Number of children: None    Years of education: None    Highest education level: None   Occupational History     Employer: [employer]     Comment: Full-Time Employment   Tobacco Use    Smoking status: Former Smoker    Smokeless tobacco: Never Used   Substance and Sexual Activity    Alcohol use:  Yes     Alcohol/week: 3 0 standard drinks     Types: 3 Cans of beer per week     Comment: every other day; Social    Drug use: No    Sexual activity: Yes   Other Topics Concern    None   Social History Narrative    Always uses seat belt    Daily Coffee Consumption    Dental Care, Regularly    Exercises regularly    Multiple organ donor    No guns in the home    No Living will    Denied Tea    Denied Power of  in existence    Water intake, adequate(per day) Social Determinants of Health     Financial Resource Strain: Not on file   Food Insecurity: Not on file   Transportation Needs: Not on file   Physical Activity: Not on file   Stress: Not on file   Social Connections: Not on file   Intimate Partner Violence: Not on file   Housing Stability: Not on file      Current Medications:     Current Outpatient Medications   Medication Sig Dispense Refill    DULoxetine (CYMBALTA) 60 mg delayed release capsule Take 1 capsule (60 mg total) by mouth daily 30 capsule 5    methocarbamol (ROBAXIN) 500 mg tablet Take 1 tablet (500 mg total) by mouth 4 (four) times a day for 5 days 20 tablet 0    methylphenidate (CONCERTA) 54 MG ER tablet Take 1 tablet (54 mg total) by mouth daily Max Daily Amount: 54 mg 30 tablet 0    sildenafil (VIAGRA) 50 MG tablet Take 1 tablet (50 mg total) by mouth as needed for erectile dysfunction 10 tablet 1    valACYclovir (VALTREX) 1,000 mg tablet Take 1 tablet (1,000 mg total) by mouth 2 (two) times a day for 10 days 20 tablet 0     No current facility-administered medications for this visit  Allergies:     No Known Allergies   Physical Exam:     /90   Pulse 87   Temp 98 3 °F (36 8 °C)   Ht 6' 1" (1 854 m)   Wt 91 7 kg (202 lb 3 2 oz)   SpO2 99%   BMI 26 68 kg/m²     Physical Exam  Vitals reviewed  Constitutional:       Appearance: He is well-developed  HENT:      Head: Normocephalic and atraumatic  Right Ear: Tympanic membrane, ear canal and external ear normal       Left Ear: Tympanic membrane, ear canal and external ear normal    Eyes:      Extraocular Movements: Extraocular movements intact  Conjunctiva/sclera: Conjunctivae normal       Pupils: Pupils are equal, round, and reactive to light  Neck:      Thyroid: No thyromegaly  Vascular: No JVD  Cardiovascular:      Rate and Rhythm: Normal rate and regular rhythm  Pulses: Normal pulses  Heart sounds: Normal heart sounds   No murmur heard       Pulmonary:      Effort: Pulmonary effort is normal  No respiratory distress  Breath sounds: Normal breath sounds  No wheezing or rales  Abdominal:      General: Bowel sounds are normal  There is no distension  Palpations: Abdomen is soft  There is no mass  Tenderness: There is no abdominal tenderness  There is no right CVA tenderness or left CVA tenderness  Musculoskeletal:         General: No swelling, tenderness or deformity  Normal range of motion  Cervical back: Normal range of motion and neck supple  No muscular tenderness  Right lower leg: No edema  Left lower leg: No edema  Lymphadenopathy:      Cervical: No cervical adenopathy  Skin:     General: Skin is warm  Capillary Refill: Capillary refill takes less than 2 seconds  Neurological:      Mental Status: He is alert and oriented to person, place, and time  Cranial Nerves: No cranial nerve deficit  Sensory: No sensory deficit  Motor: No weakness or abnormal muscle tone  Coordination: Coordination normal       Gait: Gait normal       Deep Tendon Reflexes: Reflexes normal    Psychiatric:         Mood and Affect: Mood normal          Behavior: Behavior normal          Thought Content:  Thought content normal          Judgment: Judgment normal           MD Antonio StilesDignity [facility]

## 2022-01-28 NOTE — ASSESSMENT & PLAN NOTE
PHQ-2/9 Depression Screening    Little interest or pleasure in doing things: 0 - not at all  Feeling down, depressed, or hopeless: 1 - several days  Trouble falling or staying asleep, or sleeping too much: 0 - not at all  Feeling tired or having little energy: 1 - several days  Poor appetite or overeatin - not at all  Feeling bad about yourself - or that you are a failure or have let yourself or your family down: 0 - not at all  Trouble concentrating on things, such as reading the newspaper or watching television: 1 - several days  Moving or speaking so slowly that other people could have noticed  Or the opposite - being so fidgety or restless that you have been moving around a lot more than usual: 0 - not at all  Thoughts that you would be better off dead, or of hurting yourself in some way: 0 - not at all  PHQ-9 Score: 3   PHQ-9 Interpretation: No or Minimal depression      Stable on duloxetine  Continue present care   Recheck 6m

## 2022-03-05 DIAGNOSIS — F90.0 ATTENTION DEFICIT HYPERACTIVITY DISORDER (ADHD), PREDOMINANTLY INATTENTIVE TYPE: ICD-10-CM

## 2022-03-07 RX ORDER — METHYLPHENIDATE HYDROCHLORIDE 54 MG/1
TABLET ORAL
Qty: 30 TABLET | Refills: 0 | Status: SHIPPED | OUTPATIENT
Start: 2022-03-07 | End: 2022-04-04

## 2022-03-29 NOTE — TELEPHONE ENCOUNTER
1  [id number] 02/01/2022 01/28/2022 Methylphenidate Hcl 30 0 30 54 MG NA JESSICA RAMIREZ POGODZINSKI [address] [employer] [address] [place] [address]  [id number] 01/04/2022 01/04/2022 Methylphenidate Hcl 30 0 30 54 MG NA JESSICA RAMIREZ POGODZINSKI [address]  [employer] [address] [place] [address]  [id number] 12/02/2021 12/02/2021 Methylphenidate Hcl 30 0 30 54 MG NA JESSICA RAMIREZ POGODZINSKI [address]    [employer] 0 / 0 [place] None known

## 2022-04-01 DIAGNOSIS — F90.0 ATTENTION DEFICIT HYPERACTIVITY DISORDER (ADHD), PREDOMINANTLY INATTENTIVE TYPE: ICD-10-CM

## 2022-04-01 DIAGNOSIS — N52.2 DRUG-INDUCED ERECTILE DYSFUNCTION: ICD-10-CM

## 2022-04-04 RX ORDER — SILDENAFIL 50 MG/1
TABLET, FILM COATED ORAL
Qty: 10 TABLET | Refills: 1 | Status: SHIPPED | OUTPATIENT
Start: 2022-04-04 | End: 2022-05-31

## 2022-04-04 RX ORDER — METHYLPHENIDATE HYDROCHLORIDE 54 MG/1
TABLET ORAL
Qty: 30 TABLET | Refills: 0 | Status: SHIPPED | OUTPATIENT
Start: 2022-04-04 | End: 2022-05-02

## 2022-04-04 NOTE — TELEPHONE ENCOUNTER
03/07/2022 03/07/2022 Methylphenidate Hcl (Tablet, Extended Release)  30 0 30 54 MG NA ELSPETH BLACK

## 2022-04-30 DIAGNOSIS — F90.0 ATTENTION DEFICIT HYPERACTIVITY DISORDER (ADHD), PREDOMINANTLY INATTENTIVE TYPE: ICD-10-CM

## 2022-05-02 RX ORDER — METHYLPHENIDATE HYDROCHLORIDE 54 MG/1
TABLET ORAL
Qty: 30 TABLET | Refills: 0 | Status: SHIPPED | OUTPATIENT
Start: 2022-05-02 | End: 2022-05-31

## 2022-05-02 NOTE — TELEPHONE ENCOUNTER
04/04/2022 04/04/2022 Methylphenidate Hcl (Tablet, Extended Release)  30 0 30 54 MG NA JESSICA RAMIREZ POGODZINSKI

## 2022-05-31 DIAGNOSIS — F90.0 ATTENTION DEFICIT HYPERACTIVITY DISORDER (ADHD), PREDOMINANTLY INATTENTIVE TYPE: ICD-10-CM

## 2022-05-31 DIAGNOSIS — N52.2 DRUG-INDUCED ERECTILE DYSFUNCTION: ICD-10-CM

## 2022-05-31 RX ORDER — SILDENAFIL 50 MG/1
TABLET, FILM COATED ORAL
Qty: 10 TABLET | Refills: 1 | Status: SHIPPED | OUTPATIENT
Start: 2022-05-31 | End: 2022-06-27

## 2022-05-31 RX ORDER — METHYLPHENIDATE HYDROCHLORIDE 54 MG/1
TABLET ORAL
Qty: 30 TABLET | Refills: 0 | Status: SHIPPED | OUTPATIENT
Start: 2022-05-31 | End: 2022-06-27

## 2022-05-31 NOTE — TELEPHONE ENCOUNTER
05/02/2022 05/02/2022 Methylphenidate Hcl (Tablet, Extended Release)  30 0 30 54 MG NA JESSICA RAMIREZ POGODZINSKI

## 2022-06-27 DIAGNOSIS — F90.0 ATTENTION DEFICIT HYPERACTIVITY DISORDER (ADHD), PREDOMINANTLY INATTENTIVE TYPE: ICD-10-CM

## 2022-06-27 DIAGNOSIS — N52.2 DRUG-INDUCED ERECTILE DYSFUNCTION: ICD-10-CM

## 2022-06-27 RX ORDER — METHYLPHENIDATE HYDROCHLORIDE 54 MG/1
TABLET ORAL
Qty: 30 TABLET | Refills: 0 | Status: SHIPPED | OUTPATIENT
Start: 2022-06-27 | End: 2022-08-03

## 2022-06-27 RX ORDER — SILDENAFIL 50 MG/1
TABLET, FILM COATED ORAL
Qty: 10 TABLET | Refills: 1 | Status: SHIPPED | OUTPATIENT
Start: 2022-06-27

## 2022-06-27 NOTE — TELEPHONE ENCOUNTER
05/31/2022 05/31/2022 Methylphenidate Hcl (Tablet, Extended Release)  30 0 30 54 MG NA JESSICA RAMIREZ POGODZINSKI

## 2022-07-24 DIAGNOSIS — F33.0 MILD EPISODE OF RECURRENT MAJOR DEPRESSIVE DISORDER (HCC): ICD-10-CM

## 2022-07-25 RX ORDER — DULOXETIN HYDROCHLORIDE 60 MG/1
CAPSULE, DELAYED RELEASE ORAL
Qty: 30 CAPSULE | Refills: 5 | Status: SHIPPED | OUTPATIENT
Start: 2022-07-25

## 2022-07-27 ENCOUNTER — HOSPITAL ENCOUNTER (EMERGENCY)
Facility: HOSPITAL | Age: 42
Discharge: HOME | End: 2022-07-27
Attending: EMERGENCY MEDICINE
Payer: OTHER MISCELLANEOUS

## 2022-07-27 VITALS
RESPIRATION RATE: 18 BRPM | OXYGEN SATURATION: 96 % | DIASTOLIC BLOOD PRESSURE: 79 MMHG | SYSTOLIC BLOOD PRESSURE: 135 MMHG | HEART RATE: 67 BPM | TEMPERATURE: 98 F

## 2022-07-27 DIAGNOSIS — S05.01XA CORNEAL ABRASION, RIGHT, INITIAL ENCOUNTER: Primary | ICD-10-CM

## 2022-07-27 PROCEDURE — 99284 EMERGENCY DEPT VISIT MOD MDM: CPT | Performed by: EMERGENCY MEDICINE

## 2022-07-27 PROCEDURE — 99283 EMERGENCY DEPT VISIT LOW MDM: CPT

## 2022-07-27 RX ORDER — TETRACAINE HYDROCHLORIDE 5 MG/ML
2 SOLUTION OPHTHALMIC ONCE
Status: COMPLETED | OUTPATIENT
Start: 2022-07-27 | End: 2022-07-27

## 2022-07-27 RX ORDER — ERYTHROMYCIN 5 MG/G
0.5 OINTMENT OPHTHALMIC ONCE
Status: COMPLETED | OUTPATIENT
Start: 2022-07-27 | End: 2022-07-27

## 2022-07-27 RX ORDER — ERYTHROMYCIN 5 MG/G
OINTMENT OPHTHALMIC
Qty: 1 G | Refills: 0 | Status: SHIPPED | OUTPATIENT
Start: 2022-07-27 | End: 2022-08-08 | Stop reason: ALTCHOICE

## 2022-07-27 RX ORDER — IBUPROFEN 400 MG/1
400 TABLET ORAL ONCE
Status: COMPLETED | OUTPATIENT
Start: 2022-07-27 | End: 2022-07-27

## 2022-07-27 RX ADMIN — FLUORESCEIN SODIUM 1 STRIP: 1 STRIP OPHTHALMIC at 11:35

## 2022-07-27 RX ADMIN — IBUPROFEN 400 MG: 400 TABLET, FILM COATED ORAL at 11:44

## 2022-07-27 RX ADMIN — TETRACAINE HYDROCHLORIDE 2 DROP: 5 SOLUTION OPHTHALMIC at 11:35

## 2022-07-27 RX ADMIN — ERYTHROMYCIN 0.5 INCH: 5 OINTMENT OPHTHALMIC at 11:44

## 2022-07-27 NOTE — ED PROVIDER NOTES
History  Chief Complaint   Patient presents with    Eye Injury     Pt with eye injury while working today  Pt reports he hit himself in the right eye with  plastic wrapping  Unknown if something is in the eye  Right eye is red  NO blood thinners  History provided by:  Medical records and patient   used: No    Eye Pain  Location:  Right eye  Quality:  Sharp  Severity:  Mild  Onset quality:  Sudden  Duration:  1 hour  Timing:  Constant  Progression:  Unchanged  Chronicity:  New  Context:  Was at working, something plastic hit in face  Relieved by:  Eye closure  Worsened by:  Opening eye  Ineffective treatments:  None tried  Associated symptoms: no fever, no headaches, no loss of consciousness, no rhinorrhea and no sore throat    Risk factors:  None, does not wear contacts      Prior to Admission Medications   Prescriptions Last Dose Informant Patient Reported? Taking?    DULoxetine (CYMBALTA) 60 mg delayed release capsule   No No   Sig: TAKE 1 CAPSULE BY MOUTH EVERY DAY   methocarbamol (ROBAXIN) 500 mg tablet  Self No No   Sig: Take 1 tablet (500 mg total) by mouth 4 (four) times a day for 5 days   methylphenidate (CONCERTA) 54 MG ER tablet   No No   Sig: TAKE 1 TABLET BY MOUTH EVERY DAY - MAXIMUM DAILY DOSE 1/DAY   sildenafil (VIAGRA) 50 MG tablet   No No   Sig: TAKE 1 TABLET BY MOUTH AS NEEDED FOR ERECTILE DYSFUNCTION   valACYclovir (VALTREX) 1,000 mg tablet   No No   Sig: Take 1 tablet (1,000 mg total) by mouth 2 (two) times a day for 10 days      Facility-Administered Medications: None       Past Medical History:   Diagnosis Date    Bilateral carpal tunnel syndrome     Last Assessed 12/23/2015       Past Surgical History:   Procedure Laterality Date    WA REVISE MEDIAN N/CARPAL TUNNEL SURG Left 10/25/2016    Procedure: CARPAL TUNNEL RELEASE ;  Surgeon: Geraldine Connell DO;  [facility];  Service: Orthopedics    WA REVISE MEDIAN N/CARPAL TUNNEL SURG Right 3/22/2016    Procedure: CARPAL TUNNEL RELEASE ;  Surgeon: Tri Baldwin DO;  [facility];  Service: Orthopedics    VASECTOMY      Vas Deferens       Family History   Problem Relation Age of Onset    Hypertension Father         Essential     I have reviewed and agree with the history as documented  E-Cigarette/Vaping     E-Cigarette/Vaping Substances     Social History     Tobacco Use    Smoking status: Former Smoker    Smokeless tobacco: Never Used   Substance Use Topics    Alcohol use: Yes     Alcohol/week: 3 0 standard drinks     Types: 3 Cans of beer per week     Comment: every other day; Social    Drug use: No       Review of Systems   Constitutional: Negative for fever  HENT: Negative for rhinorrhea and sore throat  Eyes: Positive for pain and redness  Negative for photophobia, discharge and visual disturbance  Skin: Negative for wound  Neurological: Negative for loss of consciousness and headaches  All other systems reviewed and are negative  Physical Exam  Physical Exam  Vitals and nursing note reviewed  Constitutional:       Appearance: He is well-developed  He is not diaphoretic  HENT:      Head: Normocephalic and atraumatic  Eyes:      General: Lids are normal  Lids are everted, no foreign bodies appreciated  Vision grossly intact  Gaze aligned appropriately  No scleral icterus  Right eye: No foreign body  Extraocular Movements: Extraocular movements intact  Right eye: Normal extraocular motion and no nystagmus  Left eye: Normal extraocular motion and no nystagmus  Conjunctiva/sclera:      Right eye: Right conjunctiva is injected  No chemosis, exudate or hemorrhage  Pupils: Pupils are equal, round, and reactive to light  Right eye: Corneal abrasion and fluorescein uptake present  Adam exam negative  Slit lamp exam:     Right eye: No foreign body, hyphema or hypopyon  Cardiovascular:      Rate and Rhythm: Normal rate and regular rhythm  Pulmonary:      Effort: Pulmonary effort is normal  No respiratory distress  Musculoskeletal:         General: No deformity  Normal range of motion  Cervical back: Normal range of motion  Skin:     General: Skin is warm and dry  Neurological:      Mental Status: He is alert and oriented to person, place, and time  Psychiatric:         Behavior: Behavior normal          Thought Content: Thought content normal          Judgment: Judgment normal          Vital Signs  ED Triage Vitals [07/27/22 1022]   Temperature Pulse Respirations Blood Pressure SpO2   98 °F (36 7 °C) 67 18 135/79 96 %      Temp Source Heart Rate Source Patient Position - Orthostatic VS BP Location FiO2 (%)   Oral Monitor -- -- --      Pain Score       No Pain           Vitals:    07/27/22 1022   BP: 135/79   Pulse: 67         Visual Acuity      ED Medications  Medications   tetracaine 0 5 % ophthalmic solution 2 drop (2 drops Right Eye Given by Other 7/27/22 1135)   fluorescein sodium sterile ophthalmic strip 1 strip (1 strip Right Eye Given by Other 7/27/22 1135)   ibuprofen (MOTRIN) tablet 400 mg (400 mg Oral Given 7/27/22 1144)   erythromycin (ILOTYCIN) 0 5 % ophthalmic ointment 0 5 inch (0 5 inches Right Eye Given 7/27/22 1144)       Diagnostic Studies  Results Reviewed     None                 No orders to display              Procedures  Procedures         ED Course                               SBIRT 22yo+    Flowsheet Row Most Recent Value   SBIRT (25 yo +)    In order to provide better care to our patients, we are screening all of our patients for alcohol and drug use  Would it be okay to ask you these screening questions?  Unable to answer at this time Filed at: 07/27/2022 1132                    MDM  Number of Diagnoses or Management Options  Corneal abrasion, right, initial encounter  Diagnosis management comments: Exam c/w corneal abrasion, lid sebastian and no FB appreciated, significant relief with tetracaine, no s/s traumatic iritis  Pt given erythro ointment and close eye follow up  RTER precautions discussed and documented on discharge paperwork, pt and family endorsed good understanding of reasons to return  Disposition  Final diagnoses:   Corneal abrasion, right, initial encounter     Time reflects when diagnosis was documented in both MDM as applicable and the Disposition within this note     Time User Action Codes Description Comment    7/27/2022 11:32 AM Opal Johnson Add [S05 01XA] Corneal abrasion, right, initial encounter       ED Disposition     ED Disposition   Discharge    Condition   Stable    Date/Time   Wed Jul 27, 2022 [address] [facility] discharge to home/self care  Follow-up Information     Follow up With Specialties Details Why [address] [facility],  Ophthalmology Schedule an appointment as soon as possible for a visit   [facility]  [id number] [place] [address]            Discharge Medication List as of 7/27/2022 11:41 AM      START taking these medications    Details   erythromycin (ILOTYCIN) ophthalmic ointment Place a 1/2 inch ribbon of ointment into the lower eyelid  , Normal         CONTINUE these medications which have NOT CHANGED    Details   DULoxetine (CYMBALTA) 60 mg delayed release capsule TAKE 1 CAPSULE BY MOUTH EVERY DAY, Normal      methocarbamol (ROBAXIN) 500 mg tablet Take 1 tablet (500 mg total) by mouth 4 (four) times a day for 5 days, Starting Sun 1/23/2022, Until Fri 1/28/2022, Normal      methylphenidate (CONCERTA) 54 MG ER tablet TAKE 1 TABLET BY MOUTH EVERY DAY - MAXIMUM DAILY DOSE 1/DAY, Normal      sildenafil (VIAGRA) 50 MG tablet TAKE 1 TABLET BY MOUTH AS NEEDED FOR ERECTILE DYSFUNCTION, Normal      valACYclovir (VALTREX) 1,000 mg tablet Take 1 tablet (1,000 mg total) by mouth 2 (two) times a day for 10 days, Starting Tue 8/24/2021, Until Fri 9/3/2021, Normal                 PDMP Review       Value Time User PDMP Reviewed  Yes 6/27/2022  7:55 AM Medardo Blackman MD          ED Provider  Electronically Signed by           Muna Desai MD  07/27/22 2422

## 2022-07-28 ENCOUNTER — NEW PATIENT (OUTPATIENT)
Dept: URBAN - METROPOLITAN AREA CLINIC 6 | Facility: CLINIC | Age: 42
End: 2022-07-28

## 2022-07-28 DIAGNOSIS — S05.01XS: ICD-10-CM

## 2022-07-28 PROCEDURE — 92002 INTRM OPH EXAM NEW PATIENT: CPT

## 2022-07-28 ASSESSMENT — VISUAL ACUITY
OD_SC: 20/25
OU_SC: J1

## 2022-07-28 ASSESSMENT — TONOMETRY
OS_IOP_MMHG: 19
OD_IOP_MMHG: 19

## 2022-07-28 ASSESSMENT — KERATOMETRY
OS_AXISANGLE_DEGREES: 100
OD_AXISANGLE2_DEGREES: 115
OD_AXISANGLE_DEGREES: 025
OS_AXISANGLE2_DEGREES: 10
OS_K2POWER_DIOPTERS: 42.00
OS_K1POWER_DIOPTERS: 41.00
OD_K1POWER_DIOPTERS: 41.00
OD_K2POWER_DIOPTERS: 41.75

## 2022-08-02 DIAGNOSIS — F90.0 ATTENTION DEFICIT HYPERACTIVITY DISORDER (ADHD), PREDOMINANTLY INATTENTIVE TYPE: ICD-10-CM

## 2022-08-02 DIAGNOSIS — F33.0 MILD EPISODE OF RECURRENT MAJOR DEPRESSIVE DISORDER (HCC): ICD-10-CM

## 2022-08-03 RX ORDER — METHYLPHENIDATE HYDROCHLORIDE 54 MG/1
TABLET ORAL
Qty: 30 TABLET | Refills: 0 | Status: SHIPPED | OUTPATIENT
Start: 2022-08-03 | End: 2022-09-06

## 2022-08-03 RX ORDER — DULOXETIN HYDROCHLORIDE 60 MG/1
CAPSULE, DELAYED RELEASE ORAL
Qty: 30 CAPSULE | Refills: 5 | OUTPATIENT
Start: 2022-08-03

## 2022-08-03 NOTE — TELEPHONE ENCOUNTER
06/27/2022 06/27/2022 Methylphenidate Hcl (Tablet, Extended Release)  30 0 30 54 MG NA JESSICA RAMIREZ POGODZINSKI

## 2022-08-08 ENCOUNTER — OFFICE VISIT (OUTPATIENT)
Dept: FAMILY MEDICINE CLINIC | Facility: CLINIC | Age: 42
End: 2022-08-08
Payer: COMMERCIAL

## 2022-08-08 VITALS
BODY MASS INDEX: 26.24 KG/M2 | SYSTOLIC BLOOD PRESSURE: 130 MMHG | DIASTOLIC BLOOD PRESSURE: 80 MMHG | HEIGHT: 73 IN | HEART RATE: 72 BPM | TEMPERATURE: 98.5 F | WEIGHT: 198 LBS

## 2022-08-08 DIAGNOSIS — Z13.6 SCREENING FOR CARDIOVASCULAR, RESPIRATORY, AND GENITOURINARY DISEASES: ICD-10-CM

## 2022-08-08 DIAGNOSIS — Z13.83 SCREENING FOR CARDIOVASCULAR, RESPIRATORY, AND GENITOURINARY DISEASES: ICD-10-CM

## 2022-08-08 DIAGNOSIS — F33.41 RECURRENT MAJOR DEPRESSIVE DISORDER, IN PARTIAL REMISSION (HCC): ICD-10-CM

## 2022-08-08 DIAGNOSIS — Z13.89 SCREENING FOR CARDIOVASCULAR, RESPIRATORY, AND GENITOURINARY DISEASES: ICD-10-CM

## 2022-08-08 DIAGNOSIS — F90.0 ATTENTION DEFICIT HYPERACTIVITY DISORDER (ADHD), PREDOMINANTLY INATTENTIVE TYPE: Primary | ICD-10-CM

## 2022-08-08 PROCEDURE — 99214 OFFICE O/P EST MOD 30 MIN: CPT | Performed by: FAMILY MEDICINE

## 2022-08-08 PROCEDURE — 3725F SCREEN DEPRESSION PERFORMED: CPT | Performed by: FAMILY MEDICINE

## 2022-08-08 NOTE — PROGRESS NOTES
Assessment/Plan:    Attention deficit hyperactivity disorder (ADHD)  Pt continues to do well  Continue present meds  Recheck 6m    Recurrent major depressive disorder, in partial remission ([place] [address] )  Depression Screening Follow-up Plan: Patient's depression screening was positive with a PHQ-2 score of   Their PHQ-9 score was 0  Patient assessed for underlying major depression  They have no active suicidal ideations  Brief counseling provided and recommend additional follow-up/re-evaluation next office visit  Continue present medications  Recheck 6m - earlier if worse       Diagnoses and all orders for this visit:    Attention deficit hyperactivity disorder (ADHD), predominantly inattentive type    Recurrent major depressive disorder, in partial remission ([place] [address] )  -     CBC and differential; Future  -     Comprehensive metabolic panel; Future  -     TSH, 3rd generation with Free T4 reflex; Future    Screening for cardiovascular, respiratory, and genitourinary diseases  -     CBC and differential; Future  -     Comprehensive metabolic panel; Future  -     Lipid panel; Future        Subjective:      Patient ID: Hetal Cortez is a 39 y o  male  f/u multiple med issues  - pt doing well  - pt states that stress is better  He is only working days - no longer doing alternating shifts  - mood stable  Denies feeling depressed or suffering from anhedonia  Concentration stable  - rides his bike 1-2x a week (20-30mi per ride/50-60mi/week)  Pt denies CP, palpitations, lightheadedness or other CV symptoms with or without exertion  - no new GI or  issues        The following portions of the patient's history were reviewed and updated as appropriate:   He  has a past medical history of Bilateral carpal tunnel syndrome    He   Patient Active Problem List    Diagnosis Date Noted    Recurrent major depressive disorder, in partial remission ([place] [address] ) 77/23/2787    Uncomplicated alcohol dependence ([place] [address] ) 12/11/2018    Attention deficit hyperactivity disorder (ADHD) 09/24/2012     He  has a past surgical history that includes pr revise median n/carpal tunnel surg (Left, 10/25/2016); pr revise median n/carpal tunnel surg (Right, 3/22/2016); and Vasectomy  He  reports that he has quit smoking  He has never used smokeless tobacco  He reports current alcohol use of about 3 0 standard drinks of alcohol per week  He reports that he does not use drugs  Current Outpatient Medications   Medication Sig Dispense Refill    DULoxetine (CYMBALTA) 60 mg delayed release capsule TAKE 1 CAPSULE BY MOUTH EVERY DAY 30 capsule 5    methylphenidate (CONCERTA) 54 MG ER tablet TAKE 1 TABLET BY MOUTH EVERY DAY, MAXIMUM DAILY DOSE OF 1 TABLET PER DAY 30 tablet 0    sildenafil (VIAGRA) 50 MG tablet TAKE 1 TABLET BY MOUTH AS NEEDED FOR ERECTILE DYSFUNCTION 10 tablet 1    methocarbamol (ROBAXIN) 500 mg tablet Take 1 tablet (500 mg total) by mouth 4 (four) times a day for 5 days 20 tablet 0    valACYclovir (VALTREX) 1,000 mg tablet Take 1 tablet (1,000 mg total) by mouth 2 (two) times a day for 10 days 20 tablet 0     No current facility-administered medications for this visit  He has No Known Allergies       Review of Systems   Constitutional: Negative  HENT: Negative  Eyes: Negative  Respiratory: Negative  Cardiovascular: Negative  Gastrointestinal: Negative  Endocrine: Negative  Genitourinary: Negative  Musculoskeletal: Negative  Skin: Negative  Allergic/Immunologic: Negative  Neurological: Negative  Hematological: Negative  Psychiatric/Behavioral: Negative  All other systems reviewed and are negative  Objective:      /80   Pulse 72   Temp 98 5 °F (36 9 °C)   Ht 6' 1" (1 854 m)   Wt 89 8 kg (198 lb)   BMI 26 12 kg/m²          Physical Exam  Vitals reviewed  Constitutional:       Appearance: He is well-developed  HENT:      Head: Normocephalic and atraumatic        Right Ear: Tympanic membrane, ear canal and external ear normal       Left Ear: Tympanic membrane, ear canal and external ear normal       Mouth/Throat:      Mouth: Mucous membranes are moist    Eyes:      General: No scleral icterus  Extraocular Movements: Extraocular movements intact  Conjunctiva/sclera: Conjunctivae normal       Pupils: Pupils are equal, round, and reactive to light  Neck:      Thyroid: No thyromegaly  Vascular: No carotid bruit  Cardiovascular:      Rate and Rhythm: Normal rate and regular rhythm  Pulses: Normal pulses  Heart sounds: Normal heart sounds  No murmur heard  Pulmonary:      Effort: Pulmonary effort is normal       Breath sounds: Normal breath sounds  Abdominal:      General: Bowel sounds are normal  There is no distension  Palpations: Abdomen is soft  There is no mass  Tenderness: There is no abdominal tenderness  Musculoskeletal:         General: No swelling, tenderness or deformity  Normal range of motion  Cervical back: Normal range of motion and neck supple  No muscular tenderness  Right lower leg: No edema  Left lower leg: No edema  Lymphadenopathy:      Cervical: No cervical adenopathy  Skin:     General: Skin is warm and dry  Capillary Refill: Capillary refill takes less than 2 seconds  Neurological:      Mental Status: He is alert and oriented to person, place, and time  Cranial Nerves: No cranial nerve deficit  Sensory: No sensory deficit  Motor: No weakness  Gait: Gait normal    Psychiatric:         Mood and Affect: Mood normal          Behavior: Behavior normal          Thought Content:  Thought content normal          Judgment: Judgment normal       Comments: PHQ-2/9 Depression Screening    Little interest or pleasure in doing things: 0 - not at all  Feeling down, depressed, or hopeless: 0 - not at all  Trouble falling or staying asleep, or sleeping too much: 0 - not at all  Feeling tired or having little energy: 0 - not at all  Poor appetite or overeatin - not at all  Feeling bad about yourself - or that you are a failure or have let   yourself or your family down: 0 - not at all  Trouble concentrating on things, such as reading the newspaper or watching   television: 0 - not at all  Moving or speaking so slowly that other people could have noticed   Or the   opposite - being so fidgety or restless that you have been moving around a   lot more than usual: 0 - not at all  Thoughts that you would be better off dead, or of hurting yourself in some   way: 0 - not at all  PHQ-9 Score: 0   PHQ-9 Interpretation: No or Minimal depression

## 2022-08-10 ENCOUNTER — ESTABLISHED COMPREHENSIVE EXAM (OUTPATIENT)
Dept: URBAN - METROPOLITAN AREA CLINIC 6 | Facility: CLINIC | Age: 42
End: 2022-08-10

## 2022-08-10 DIAGNOSIS — Z01.00: ICD-10-CM

## 2022-08-10 PROCEDURE — 92015 DETERMINE REFRACTIVE STATE: CPT

## 2022-08-10 PROCEDURE — 92014 COMPRE OPH EXAM EST PT 1/>: CPT

## 2022-08-10 ASSESSMENT — KERATOMETRY
OS_AXISANGLE2_DEGREES: 10
OD_AXISANGLE_DEGREES: 025
OD_K1POWER_DIOPTERS: 41.00
OS_K2POWER_DIOPTERS: 42.00
OD_AXISANGLE2_DEGREES: 115
OS_AXISANGLE_DEGREES: 100
OD_K2POWER_DIOPTERS: 41.75
OS_K1POWER_DIOPTERS: 41.00

## 2022-08-10 ASSESSMENT — VISUAL ACUITY
OD_SC: 20/20-1
OS_SC: 20/25+2

## 2022-08-10 ASSESSMENT — TONOMETRY
OD_IOP_MMHG: 15
OS_IOP_MMHG: 16

## 2022-09-06 DIAGNOSIS — F90.0 ATTENTION DEFICIT HYPERACTIVITY DISORDER (ADHD), PREDOMINANTLY INATTENTIVE TYPE: ICD-10-CM

## 2022-09-06 RX ORDER — METHYLPHENIDATE HYDROCHLORIDE 54 MG/1
TABLET ORAL
Qty: 30 TABLET | Refills: 0 | Status: SHIPPED | OUTPATIENT
Start: 2022-09-06 | End: 2022-10-07

## 2022-09-06 NOTE — TELEPHONE ENCOUNTER
08/03/2022 08/03/2022 Methylphenidate Hcl (Tablet, Extended Release)  30 0 30 54 MG NA JESSICA RAMIREZ POGODZINSKI

## 2022-09-13 ENCOUNTER — OFFICE VISIT (OUTPATIENT)
Dept: FAMILY MEDICINE CLINIC | Facility: CLINIC | Age: 42
End: 2022-09-13
Payer: COMMERCIAL

## 2022-09-13 VITALS
WEIGHT: 196 LBS | HEIGHT: 73 IN | HEART RATE: 76 BPM | OXYGEN SATURATION: 96 % | RESPIRATION RATE: 16 BRPM | BODY MASS INDEX: 25.98 KG/M2 | TEMPERATURE: 97.1 F | SYSTOLIC BLOOD PRESSURE: 126 MMHG | DIASTOLIC BLOOD PRESSURE: 80 MMHG

## 2022-09-13 DIAGNOSIS — L08.9 INFECTED CYST OF SKIN: Primary | ICD-10-CM

## 2022-09-13 DIAGNOSIS — L72.9 INFECTED CYST OF SKIN: Primary | ICD-10-CM

## 2022-09-13 PROCEDURE — 10060 I&D ABSCESS SIMPLE/SINGLE: CPT | Performed by: FAMILY MEDICINE

## 2022-09-13 PROCEDURE — 99213 OFFICE O/P EST LOW 20 MIN: CPT | Performed by: FAMILY MEDICINE

## 2022-09-13 RX ORDER — CEPHALEXIN 500 MG/1
500 CAPSULE ORAL EVERY 8 HOURS SCHEDULED
Qty: 21 CAPSULE | Refills: 0 | Status: SHIPPED | OUTPATIENT
Start: 2022-09-13 | End: 2022-09-20

## 2022-09-13 NOTE — PROGRESS NOTES
Name: Rhys Vila      : 1980      MRN: [id number]  Encounter Provider: Veronica Valdez MD  Encounter Date: 2022   Encounter department: [address] [place] [address] Road     1  Infected cyst of skin  Assessment & Plan:  Status post I and D  Discussed wound care and signs of worsening infection with patient  Remove packing in 48 hours  Start cephalexin 500 mg t i d  For 5-7 days  Recheck 1 week if not resolving-earlier for signs of worsening infection  Patient call for any other problems or concerns in the interim    Orders:  -     Incision and Drainage  -     cephalexin (KEFLEX) 500 mg capsule; Take 1 capsule (500 mg total) by mouth every 8 (eight) hours for 7 days           Subjective      43year-old male with a history of back epidural inclusion cyst notes increased size and mild increased discomfort over last week  He denies any fever chills  There has been no drainage  Patient is here for evaluation  Review of Systems   Constitutional: Negative      Skin:        Mid back cyst, increasing in size in slight tenderness       Current Outpatient Medications on File Prior to Visit   Medication Sig    DULoxetine (CYMBALTA) 60 mg delayed release capsule TAKE 1 CAPSULE BY MOUTH EVERY DAY    methylphenidate (CONCERTA) 54 MG ER tablet TAKE 1 TABLET BY MOUTH EVERY DAY, MAXIMUM DAILY DOSE OF 1 TABLET PER DAY    sildenafil (VIAGRA) 50 MG tablet TAKE 1 TABLET BY MOUTH AS NEEDED FOR ERECTILE DYSFUNCTION    methocarbamol (ROBAXIN) 500 mg tablet Take 1 tablet (500 mg total) by mouth 4 (four) times a day for 5 days (Patient not taking: Reported on 2022)    valACYclovir (VALTREX) 1,000 mg tablet Take 1 tablet (1,000 mg total) by mouth 2 (two) times a day for 10 days (Patient not taking: Reported on 2022)       Objective     /80   Pulse 76   Temp (!) 97 1 °F (36 2 °C)   Resp 16   Ht 6' 1" (1 854 m)   Wt 88 9 kg (196 lb)   SpO2 96%   BMI 25 86 kg/m²     Physical Exam  Vitals reviewed  Skin:     Comments: Midback with slightly erythematous, slightly tender slightly fluctuant right midline midback cyst   No drainage appreciated  Neurological:      Mental Status: He is alert  Incision and Drainage    Date/Time: 9/13/2022 2:50 PM  Performed by: Sanjeev Bee MD  Authorized by: Sanjeev Bee MD   Universal Protocol:  Consent: Verbal consent obtained  Written consent obtained  Risks and benefits: risks, benefits and alternatives were discussed  Consent given by: patient  Patient understanding: patient states understanding of the procedure being performed  Patient consent: the patient's understanding of the procedure matches consent given  Site marked: the operative site was marked  Patient identity confirmed: verbally with patient      Patient location:  Clinic  Location:     Type:  Cyst and abscess  Pre-procedure details:     Skin preparation:  Betadine  Anesthesia (see MAR for exact dosages): Anesthesia method:  Local infiltration    Local anesthetic:  Lidocaine 1% w/o epi  Procedure details:     Complexity:  Simple    Needle aspiration: no      Incision types:  Stab incision    Scalpel blade:  10    Approach:  Open    Incision depth:  Subcutaneous    Wound management:  Probed and deloculated    Drainage:  Purulent    Drainage amount:   Moderate    Wound treatment:  Packing placed    Packing materials:  1/4 in iodoform gauze    Amount 1/4" iodoform:  5"      Sanjeev Bee MD

## 2022-09-14 NOTE — ASSESSMENT & PLAN NOTE
Status post I and D  Discussed wound care and signs of worsening infection with patient  Remove packing in 48 hours  Start cephalexin 500 mg t i d  For 5-7 days  Recheck 1 week if not resolving-earlier for signs of worsening infection    Patient call for any other problems or concerns in the interim

## 2022-10-05 DIAGNOSIS — N52.2 DRUG-INDUCED ERECTILE DYSFUNCTION: ICD-10-CM

## 2022-10-05 DIAGNOSIS — F90.0 ATTENTION DEFICIT HYPERACTIVITY DISORDER (ADHD), PREDOMINANTLY INATTENTIVE TYPE: ICD-10-CM

## 2022-10-07 RX ORDER — METHYLPHENIDATE HYDROCHLORIDE 54 MG/1
TABLET ORAL
Qty: 30 TABLET | Refills: 0 | Status: SHIPPED | OUTPATIENT
Start: 2022-10-07

## 2022-10-07 RX ORDER — SILDENAFIL 50 MG/1
TABLET, FILM COATED ORAL
Qty: 10 TABLET | Refills: 1 | Status: SHIPPED | OUTPATIENT
Start: 2022-10-07 | End: 2022-10-14 | Stop reason: SDUPTHER

## 2022-10-07 NOTE — TELEPHONE ENCOUNTER
1 [id number] 09/06/2022 09/06/2022 Methylphenidate Hcl (Tablet, Extended Release) 30 0 30 54 MG NA JESSICA RAMIREZ POGODZINSKI [address]   [employer] 0 / 0 [place]

## 2022-10-14 DIAGNOSIS — N52.2 DRUG-INDUCED ERECTILE DYSFUNCTION: ICD-10-CM

## 2022-10-14 RX ORDER — SILDENAFIL 50 MG/1
50 TABLET, FILM COATED ORAL DAILY PRN
Qty: 10 TABLET | Refills: 1 | Status: SHIPPED | OUTPATIENT
Start: 2022-10-14

## 2022-11-10 DIAGNOSIS — F90.0 ATTENTION DEFICIT HYPERACTIVITY DISORDER (ADHD), PREDOMINANTLY INATTENTIVE TYPE: ICD-10-CM

## 2022-11-10 RX ORDER — METHYLPHENIDATE HYDROCHLORIDE 54 MG/1
TABLET ORAL
Qty: 30 TABLET | Refills: 0 | Status: SHIPPED | OUTPATIENT
Start: 2022-11-10

## 2022-11-10 NOTE — TELEPHONE ENCOUNTER
1 [id number] 10/07/2022 10/07/2022 Methylphenidate Hcl (Tablet, Extended Release) 30 0 30 54 MG NA JESSICA RAMIREZ POGODZINSKI [address]  [employer] 0 / 0 [place]    1 [id number] 09/06/2022 09/06/2022 Methylphenidate Hcl (Tablet, Extended Release) 30 0 30 54 MG NA JESSICA RAMIREZ POGODZINSKI [address]  [employer] 0 / 0 [place]    1 [id number] 08/03/2022 08/03/2022 Methylphenidate Hcl (Tablet, Extended Release) 30 0 30 54 MG NA JESSICA RAMIREZ POGODZINSKI [address]   [employer] 0 / 0 [place]

## 2022-12-07 DIAGNOSIS — F90.0 ATTENTION DEFICIT HYPERACTIVITY DISORDER (ADHD), PREDOMINANTLY INATTENTIVE TYPE: ICD-10-CM

## 2022-12-07 DIAGNOSIS — N52.2 DRUG-INDUCED ERECTILE DYSFUNCTION: ICD-10-CM

## 2022-12-07 RX ORDER — SILDENAFIL 50 MG/1
50 TABLET, FILM COATED ORAL DAILY PRN
Qty: 10 TABLET | Refills: 0 | Status: SHIPPED | OUTPATIENT
Start: 2022-12-07

## 2022-12-12 DIAGNOSIS — S43.401A SPRAIN OF RIGHT SHOULDER, UNSPECIFIED SHOULDER SPRAIN TYPE, INITIAL ENCOUNTER: ICD-10-CM

## 2022-12-13 RX ORDER — METHYLPHENIDATE HYDROCHLORIDE 54 MG/1
TABLET ORAL
Qty: 30 TABLET | Refills: 0 | Status: SHIPPED | OUTPATIENT
Start: 2022-12-13 | End: 2022-12-16 | Stop reason: SDUPTHER

## 2022-12-13 RX ORDER — METHOCARBAMOL 500 MG/1
500 TABLET, FILM COATED ORAL 4 TIMES DAILY
Qty: 20 TABLET | Refills: 0 | Status: SHIPPED | OUTPATIENT
Start: 2022-12-13 | End: 2022-12-18

## 2022-12-16 DIAGNOSIS — F90.0 ATTENTION DEFICIT HYPERACTIVITY DISORDER (ADHD), PREDOMINANTLY INATTENTIVE TYPE: ICD-10-CM

## 2022-12-19 ENCOUNTER — TELEPHONE (OUTPATIENT)
Dept: FAMILY MEDICINE CLINIC | Facility: CLINIC | Age: 42
End: 2022-12-19

## 2022-12-19 RX ORDER — METHYLPHENIDATE HYDROCHLORIDE 54 MG/1
54 TABLET ORAL DAILY
Qty: 30 TABLET | Refills: 0 | Status: SHIPPED | OUTPATIENT
Start: 2022-12-19

## 2022-12-19 NOTE — TELEPHONE ENCOUNTER
1 [id number] 11/10/2022 11/10/2022 Methylphenidate Hcl (Tablet, Extended Release) 30 0 30 54 MG NA JESSICA RAMIREZ POGODZINSKI [address]  [employer] 0 / 0 [place] [address] [id number] 10/07/2022 10/07/2022 Methylphenidate Hcl (Tablet, Extended Release) 30 0 30 54 MG NA JESSICA RAMIREZ POGODZINSKI [address]  [employer] 0 / 0 [place] [address] [id number] 09/06/2022 09/06/2022 Methylphenidate Hcl (Tablet, Extended Release) 30 0 30 54 MG NA JESSICA RAMIREZ POGODZINSKI [address]   [employer] 0 / 0 [place]

## 2022-12-19 NOTE — TELEPHONE ENCOUNTER
Patient states that the pharmacy told him his script for Concerta was denied or canceled, he is not sure    He is asking if we can look into it

## 2022-12-19 NOTE — TELEPHONE ENCOUNTER
Called pharmacy, medication (generic concerta) had to be ordered and will be in tomorrow  Called patient he stated the first time he called for the methylphenidate refill, the wrong medication was sent for refill  He does not need the methocarbamol medication  He wanted you to be aware

## 2023-01-12 DIAGNOSIS — F90.0 ATTENTION DEFICIT HYPERACTIVITY DISORDER (ADHD), PREDOMINANTLY INATTENTIVE TYPE: ICD-10-CM

## 2023-01-12 RX ORDER — METHYLPHENIDATE HYDROCHLORIDE 54 MG/1
TABLET ORAL
Qty: 30 TABLET | Refills: 0 | Status: SHIPPED | OUTPATIENT
Start: 2023-01-12

## 2023-01-12 NOTE — TELEPHONE ENCOUNTER
1 [id number] 12/19/2022 12/19/2022 Methylphenidate Hcl (Tablet, Extended Release) 30 0 30 54 MG NA JESSICA RAMIREZ POGODZINSKI [address]  [employer] 0 / 0 [address]    1 [id number] 11/10/2022 11/10/2022 Methylphenidate Hcl (Tablet, Extended Release) 30 0 30 54 MG NA JESSICA RAMIREZ POGODZINSKI [address]  [employer] 0 / 0 [address]    1 [id number] 10/07/2022 10/07/2022 Methylphenidate Hcl (Tablet, Extended Release) 30 0 30 54 MG JESSICA WALLER POGODZINSKI [address]   [employer] 0 / 0 [place]

## 2023-02-16 DIAGNOSIS — F33.0 MILD EPISODE OF RECURRENT MAJOR DEPRESSIVE DISORDER (HCC): ICD-10-CM

## 2023-02-16 DIAGNOSIS — F90.0 ATTENTION DEFICIT HYPERACTIVITY DISORDER (ADHD), PREDOMINANTLY INATTENTIVE TYPE: ICD-10-CM

## 2023-02-16 NOTE — TELEPHONE ENCOUNTER
[id number] 01/18/2023 01/12/2023 Methylphenidate Hcl (Tablet, Extended Release)  30 0 30 54 MG NA JESSICA RAMIREZ POGODZINSKI [address]  [employer] 0 / 0 [place] [address]  [id number] 12/19/2022 12/19/2022 Methylphenidate Hcl (Tablet, Extended Release)  30 0 30 54 MG NA JESSICA RAMIREZ POGODZINSKI [address]  [employer] 0 / 0 [place] [address]  [id number] 11/10/2022  11/10/2022 Methylphenidate Hcl (Tablet, Extended Release)  30 0 30 54 MG NA JESSICA RAMIREZ POGODZINSKI [address]  [employer] 0 / 0 [place] [address]  [id number] 10/07/2022  10/07/2022 Methylphenidate Hcl (Tablet, Extended Release)  30 0 30 54 MG NA JESSICA RAMIREZ POGODZINSKI [address]    [employer] 0 / 0 [place]

## 2023-02-17 RX ORDER — METHYLPHENIDATE HYDROCHLORIDE 54 MG/1
TABLET ORAL
Qty: 30 TABLET | Refills: 0 | Status: SHIPPED | OUTPATIENT
Start: 2023-02-17

## 2023-02-17 RX ORDER — DULOXETIN HYDROCHLORIDE 60 MG/1
CAPSULE, DELAYED RELEASE ORAL
Qty: 30 CAPSULE | Refills: 5 | Status: SHIPPED | OUTPATIENT
Start: 2023-02-17

## 2023-03-01 ENCOUNTER — TELEPHONE (OUTPATIENT)
Dept: FAMILY MEDICINE CLINIC | Facility: CLINIC | Age: 43
End: 2023-03-01

## 2023-03-01 NOTE — TELEPHONE ENCOUNTER
Patient called asking if he may have a work note from his office visit on 9/13/2022  He states his job is requiring this  Okay note?

## 2023-03-06 ENCOUNTER — OFFICE VISIT (OUTPATIENT)
Dept: FAMILY MEDICINE CLINIC | Facility: CLINIC | Age: 43
End: 2023-03-06

## 2023-03-06 VITALS
WEIGHT: 205 LBS | SYSTOLIC BLOOD PRESSURE: 120 MMHG | OXYGEN SATURATION: 97 % | BODY MASS INDEX: 27.17 KG/M2 | DIASTOLIC BLOOD PRESSURE: 80 MMHG | HEIGHT: 73 IN | HEART RATE: 95 BPM | TEMPERATURE: 97.3 F

## 2023-03-06 DIAGNOSIS — F33.41 RECURRENT MAJOR DEPRESSIVE DISORDER, IN PARTIAL REMISSION (HCC): Primary | ICD-10-CM

## 2023-03-06 DIAGNOSIS — F90.0 ATTENTION DEFICIT HYPERACTIVITY DISORDER (ADHD), PREDOMINANTLY INATTENTIVE TYPE: ICD-10-CM

## 2023-03-06 RX ORDER — METHYLPHENIDATE HYDROCHLORIDE 54 MG/1
54 TABLET ORAL DAILY
Qty: 30 TABLET | Refills: 0 | Status: SHIPPED | OUTPATIENT
Start: 2023-03-06

## 2023-03-06 NOTE — PROGRESS NOTES
Name: Tori Capone      : 1980      MRN: [id number]  Encounter Provider: Nic Good MD  Encounter Date: 3/6/2023   Encounter department: [address] [place] [address] Road     1  Recurrent major depressive disorder, in partial remission [facility])  Assessment & Plan:  Stable on present care  Continue duloxetine 60mg qd  Recheck 6m      2  Attention deficit hyperactivity disorder (ADHD), predominantly inattentive type  Comments:  refilled meds  Assessment & Plan:  Stable on present treatment  Continue present care  Recheck 6m    Orders:  -     methylphenidate (CONCERTA) 54 MG ER tablet; Take 1 tablet (54 mg total) by mouth daily Max Daily Amount: 54 mg           Subjective     f/u multiple med issues  - pt doing well  - pt states that his work stress has resolved  Mood and concentration has remained stable   - pt has been skiing a lot for exercise  Pt denies CP, palpitations, lightheadedness or other CV symptoms with or without exertion  - no new GI or  issues      Review of Systems   Constitutional: Negative  HENT: Negative  Eyes: Negative  Respiratory: Negative  Cardiovascular: Negative  Gastrointestinal: Negative  Endocrine: Negative  Genitourinary: Negative  Musculoskeletal: Negative  Skin: Negative  Allergic/Immunologic: Negative  Neurological: Negative  Hematological: Negative  Psychiatric/Behavioral: Negative          Past Medical History:   Diagnosis Date   • Bilateral carpal tunnel syndrome     Last Assessed 2015     Past Surgical History:   Procedure Laterality Date   • [place] NEUROPLASTY &/TRANSPOS MEDIAN NRV CARPAL TUNNE Left 10/25/2016    Procedure: CARPAL TUNNEL RELEASE ;  Surgeon: Nia Wei DO;  [facility];  Service: Orthopedics   • [place] NEUROPLASTY &/TRANSPOS MEDIAN NRV CARPAL TUNNE Right 3/22/2016    Procedure: CARPAL TUNNEL RELEASE ;  Surgeon: Nia Wei DO;  Location: [facility];  Service: Orthopedics • VASECTOMY      Vas Deferens     Family History   Problem Relation Age of Onset   • Hypertension Father         Essential     Social History     Socioeconomic History   • Marital status: Single     Spouse name: None   • Number of children: None   • Years of education: None   • Highest education level: None   Occupational History     Employer: [employer]     Comment: Full-Time Employment   Tobacco Use   • Smoking status: Former   • Smokeless tobacco: Never   Substance and Sexual Activity   • Alcohol use:  Yes     Alcohol/week: 3 0 standard drinks     Types: 3 Cans of beer per week     Comment: every other day; Social   • Drug use: No   • Sexual activity: Yes   Other Topics Concern   • None   Social History Narrative    Always uses seat belt    Daily Coffee Consumption    Dental Care, Regularly    Exercises regularly    Multiple organ donor    No guns in the home    No Living will    Denied Tea    Denied Power of  in existence    Water intake, adequate(per day)     Social Determinants of Health     Financial Resource Strain: Not on file   Food Insecurity: Not on file   Transportation Needs: Not on file   Physical Activity: Not on file   Stress: Not on file   Social Connections: Not on file   Intimate Partner Violence: Not on file   Housing Stability: Not on file     Current Outpatient Medications on File Prior to Visit   Medication Sig   • DULoxetine (CYMBALTA) 60 mg delayed release capsule TAKE 1 CAPSULE BY MOUTH EVERY DAY   • sildenafil (VIAGRA) 50 MG tablet Take 1 tablet (50 mg total) by mouth daily as needed for erectile dysfunction   • [DISCONTINUED] methylphenidate (CONCERTA) 54 MG ER tablet TAKE 1 TABLET BY MOUTH EVERY DAY, MAXIMUM DAILY DOSE OF 1 TABLET PER DAY   • [DISCONTINUED] methocarbamol (ROBAXIN) 500 mg tablet Take 1 tablet (500 mg total) by mouth 4 (four) times a day for 5 days   • [DISCONTINUED] valACYclovir (VALTREX) 1,000 mg tablet Take 1 tablet (1,000 mg total) by mouth 2 (two) times a day for 10 days (Patient not taking: Reported on 9/13/2022)     No Known Allergies  Immunization History   Administered Date(s) Administered   • COVID-19 MODERNA VACC 0 5 ML IM 01/05/2021, 02/04/2021   • Tdap 06/25/2008       Objective     /80 (BP Location: Left arm, Patient Position: Sitting, Cuff Size: Large)   Pulse 95   Temp (!) 97 3 °F (36 3 °C)   Ht 6' 1" (1 854 m)   Wt 93 kg (205 lb)   SpO2 97%   BMI 27 05 kg/m²     Physical Exam  Vitals and nursing note reviewed  Constitutional:       Appearance: Normal appearance  HENT:      Head: Normocephalic  Nose: Nose normal       Mouth/Throat:      Mouth: Mucous membranes are moist    Eyes:      Extraocular Movements: Extraocular movements intact  Conjunctiva/sclera: Conjunctivae normal       Pupils: Pupils are equal, round, and reactive to light  Cardiovascular:      Rate and Rhythm: Normal rate and regular rhythm  Pulses: Normal pulses  Pulmonary:      Effort: Pulmonary effort is normal    Abdominal:      General: There is no distension  Palpations: There is no mass  Tenderness: There is no abdominal tenderness  Musculoskeletal:         General: No swelling, tenderness or deformity  Normal range of motion  Cervical back: No tenderness  Right lower leg: No edema  Left lower leg: No edema  Lymphadenopathy:      Cervical: No cervical adenopathy  Skin:     General: Skin is warm  Capillary Refill: Capillary refill takes less than 2 seconds  Neurological:      General: No focal deficit present  Mental Status: He is alert and oriented to person, place, and time  Psychiatric:         Mood and Affect: Mood normal          Behavior: Behavior normal          Thought Content:  Thought content normal          Judgment: Judgment normal        Avel Tracy MD

## 2023-05-22 DIAGNOSIS — F90.0 ATTENTION DEFICIT HYPERACTIVITY DISORDER (ADHD), PREDOMINANTLY INATTENTIVE TYPE: ICD-10-CM

## 2023-05-22 RX ORDER — METHYLPHENIDATE HYDROCHLORIDE 54 MG/1
TABLET ORAL
Qty: 30 TABLET | Refills: 0 | Status: SHIPPED | OUTPATIENT
Start: 2023-05-22

## 2023-05-22 NOTE — TELEPHONE ENCOUNTER
1  [id number] 04/18/2023 04/18/2023 Methylphenidate Hcl (Tablet, Extended Release)  30 0 30 54 MG NA JESSICA RAMIREZ POGODZINSKI [address]  [employer] 0 / 0 [place] [address]  [id number] 03/17/2023 03/06/2023 Methylphenidate Hcl (Tablet, Extended Release)  30 0 30 54 MG NA JESSICA RAMIREZ POGODZINSKI [address]  [employer] 0 / 0 [place] [address]  [id number] 02/17/2023 02/17/2023 Methylphenidate Hcl (Tablet, Extended Release)  30 0 30 54 MG NA JESSICA RAMIREZ POGODZINSKI [address]  [employer] 0 / 0 [place]    1  [id number] 01/18/2023 01/12/2023 Methylphenidate Hcl (Tablet, Extended Release)  30 0 30 54 MG NA JESSICA RAMIREZ POGODZINSKI [address]  [employer] 0 / 0 [place] [address]  [id number] 12/19/2022 12/19/2022 Methylphenidate Hcl (Tablet, Extended Release)  30 0 30 54 MG JESSICA WALLER POGODZINSKI [address]  [employer] 0 / 0 [place] [address]  [id number] 11/10/2022  11/10/2022 Methylphenidate Hcl (Tablet, Extended Release)  30 0 30 54 MG JESSICA WALLER POGODZINSKI [address]  [employer] 0 / 0 [place] [address]  [id number] 10/07/2022  10/07/2022 Methylphenidate Hcl (Tablet, Extended Release)  30 0 30 54 MG JESSICA WALLER POGODZINSKI [address]  [employer] 0 / 0 [place] [address]  [id number] 09/06/2022 09/06/2022 Methylphenidate Hcl (Tablet, Extended Release)  30 0 30 54 MG JESSICA WALLER POGODZINSKI [address]  [employer] 0 / 0 [place] [address]  [id number] 08/03/2022 08/03/2022 Methylphenidate Hcl (Tablet, Extended Release)  30 0 30 54 MG NA JESSICA RAMIREZ POGODZINSKI  [address]  [employer] [facility] 0 / 0 [place]    1  [id number] 06/27/2022 06/27/2022 Methylphenidate Hcl (Tablet, Extended Release)  30 0 30 54 MG NA JESSICA RAMIREZ POGODZINSKI  [address]    [employer] [facility] 0 / 0

## 2023-06-19 DIAGNOSIS — F90.0 ATTENTION DEFICIT HYPERACTIVITY DISORDER (ADHD), PREDOMINANTLY INATTENTIVE TYPE: ICD-10-CM

## 2023-06-21 RX ORDER — METHYLPHENIDATE HYDROCHLORIDE 54 MG/1
TABLET ORAL
Qty: 30 TABLET | Refills: 0 | Status: SHIPPED | OUTPATIENT
Start: 2023-06-21

## 2023-06-21 NOTE — TELEPHONE ENCOUNTER
1 [id number] 05/23/2023 05/22/2023 Methylphenidate Hcl (Tablet, Extended Release) 30 0 30 54 MG NA JESSICA RAMIREZ POGODZINSKI [address]  [employer] 0 / 0 [place] [address] [id number] 04/18/2023 04/18/2023 Methylphenidate Hcl (Tablet, Extended Release) 30 0 30 54 MG NA JESSICA RAMIREZ POGODZINSKI [address]  [employer] 0 / 0 [place] [address] [id number] 03/17/2023 03/06/2023 Methylphenidate Hcl (Tablet, Extended Release) 30 0 30 54 MG NA JESSICA RAMIREZ POGODZINSKI [address]  [employer] 0 / 0 [place] [address] [id number] 02/17/2023 02/17/2023 Methylphenidate Hcl (Tablet, Extended Release) 30 0 30 54 MG NA JESSICA RAMIREZ POGODZINSKI [address]  [employer] 0 / 0 [place] [address] [id number] 01/18/2023 01/12/2023 Methylphenidate Hcl (Tablet, Extended Release) 30 0 30 54 MG JESSICA WALLER POGODZINSKI [address]  [employer] 0 / 0 [place] [address] [id number] 12/19/2022 12/19/2022 Methylphenidate Hcl (Tablet, Extended Release) 30 0 30 54 MG JESSICA WALLER POGODZINSKI [address]  [employer] 0 / 0 [place] [address] [id number] 11/10/2022 11/10/2022 Methylphenidate Hcl (Tablet, Extended Release) 30 0 30 54 MG JESSICA WALLER POGODZINSKI [address]  [employer] 0 / 0 [place] [address] [id number] 10/07/2022 10/07/2022 Methylphenidate Hcl (Tablet, Extended Release) 30 0 30 54 MG JESSICA WALLER POGODZINSKI [address]  [employer] 0 / 0 [place] [address] [id number] 09/06/2022 09/06/2022 Methylphenidate Hcl (Tablet, Extended Release) 30 0 30 54 MG NA JESSICA RAMIREZ POGODZINSKI [address]  [employer] 0 / 0 [place]    1 [id number] 08/03/2022 08/03/2022 Methylphenidate Hcl (Tablet, Extended Release) 30 0 30 54 MG NA JESSICA RAMIREZ POGODZINSKI [address]   [employer] [facility] 0 / 0 [place]

## 2023-07-24 DIAGNOSIS — F90.0 ATTENTION DEFICIT HYPERACTIVITY DISORDER (ADHD), PREDOMINANTLY INATTENTIVE TYPE: ICD-10-CM

## 2023-07-24 RX ORDER — METHYLPHENIDATE HYDROCHLORIDE 54 MG/1
TABLET ORAL
Qty: 30 TABLET | Refills: 0 | Status: SHIPPED | OUTPATIENT
Start: 2023-07-24

## 2023-07-24 NOTE — TELEPHONE ENCOUNTER
1 [id number] 06/21/2023 06/21/2023 Methylphenidate Hcl (Tablet, Extended Release) 30.0 30 54 MG NA JESSICA RAMIREZ, ALEXANDRA [employer]. [facility] 0 / 0 [place]    1 [id number] 05/23/2023 05/22/2023 Methylphenidate Hcl (Tablet, Extended Release) 30.0 30 54 MG NA JESSICA RAMIREZ POGODZINSKI [employer].  [facility] 0 / 0 [place]    1 [id number] 04/18/2023 04/18/2023 Methylphenidate Hcl (Tablet, Extended Release) 30.0 30 54 MG NA JESSICA RAMIREZ, [address] [place] [address]
Home

## 2023-08-08 DIAGNOSIS — F33.0 MILD EPISODE OF RECURRENT MAJOR DEPRESSIVE DISORDER (HCC): ICD-10-CM

## 2023-08-08 RX ORDER — DULOXETIN HYDROCHLORIDE 60 MG/1
CAPSULE, DELAYED RELEASE ORAL
Qty: 30 CAPSULE | Refills: 5 | Status: SHIPPED | OUTPATIENT
Start: 2023-08-08

## 2023-08-20 DIAGNOSIS — F90.0 ATTENTION DEFICIT HYPERACTIVITY DISORDER (ADHD), PREDOMINANTLY INATTENTIVE TYPE: ICD-10-CM

## 2023-08-21 NOTE — TELEPHONE ENCOUNTER
1 [id number] 07/24/2023 07/24/2023 Methylphenidate Hcl (Tablet, Extended Release) 30.0 30 54 MG NA [facility] [employer] 0 / 0 [place]    1 [id number] 06/21/2023 06/21/2023 Methylphenidate Hcl (Tablet, Extended Release) 30.0 30 54 MG NA JESSICA RAMIREZ, [facility].  [employer] 0 / 0 [place]    1 [id number] 05/23/2023 05/22/2023 Methylphenidate Hcl (Tablet, Extended Release) 30.0 30 54 MG NA JESSICA RAMIREZ, [address] [place] [address]

## 2023-08-22 RX ORDER — METHYLPHENIDATE HYDROCHLORIDE 54 MG/1
TABLET ORAL
Qty: 30 TABLET | Refills: 0 | Status: SHIPPED | OUTPATIENT
Start: 2023-08-22

## 2023-09-08 ENCOUNTER — RA CDI HCC (OUTPATIENT)
Dept: OTHER | Facility: HOSPITAL | Age: 43
End: 2023-09-08

## 2023-09-08 NOTE — PROGRESS NOTES
[address] [facility] coding opportunities          Chart Reviewed number of suggestions sent to Provider: 1  F10.20     Patients Insurance        Commercial Insurance: [employer]

## 2023-09-15 ENCOUNTER — OFFICE VISIT (OUTPATIENT)
Dept: FAMILY MEDICINE CLINIC | Facility: CLINIC | Age: 43
End: 2023-09-15

## 2023-09-15 VITALS
SYSTOLIC BLOOD PRESSURE: 122 MMHG | WEIGHT: 205 LBS | TEMPERATURE: 96.2 F | HEART RATE: 82 BPM | HEIGHT: 73 IN | OXYGEN SATURATION: 98 % | BODY MASS INDEX: 27.17 KG/M2 | DIASTOLIC BLOOD PRESSURE: 82 MMHG

## 2023-09-15 DIAGNOSIS — Z13.83 SCREENING FOR CARDIOVASCULAR, RESPIRATORY, AND GENITOURINARY DISEASES: ICD-10-CM

## 2023-09-15 DIAGNOSIS — F90.0 ATTENTION DEFICIT HYPERACTIVITY DISORDER (ADHD), PREDOMINANTLY INATTENTIVE TYPE: ICD-10-CM

## 2023-09-15 DIAGNOSIS — Z13.6 SCREENING FOR CARDIOVASCULAR, RESPIRATORY, AND GENITOURINARY DISEASES: ICD-10-CM

## 2023-09-15 DIAGNOSIS — Z00.00 ANNUAL PHYSICAL EXAM: Primary | ICD-10-CM

## 2023-09-15 DIAGNOSIS — Z13.89 SCREENING FOR CARDIOVASCULAR, RESPIRATORY, AND GENITOURINARY DISEASES: ICD-10-CM

## 2023-09-15 DIAGNOSIS — F33.41 RECURRENT MAJOR DEPRESSIVE DISORDER, IN PARTIAL REMISSION (HCC): ICD-10-CM

## 2023-09-15 PROBLEM — L72.9 INFECTED CYST OF SKIN: Status: RESOLVED | Noted: 2022-09-13 | Resolved: 2023-09-15

## 2023-09-15 PROBLEM — L08.9 INFECTED CYST OF SKIN: Status: RESOLVED | Noted: 2022-09-13 | Resolved: 2023-09-15

## 2023-09-15 RX ORDER — METHYLPHENIDATE HYDROCHLORIDE 54 MG/1
54 TABLET ORAL DAILY
Qty: 30 TABLET | Refills: 0 | Status: SHIPPED | OUTPATIENT
Start: 2023-09-15

## 2023-09-15 NOTE — PROGRESS NOTES
[address] [facility]    NAME: Ilir Rhodes  AGE: 37 y.o. SEX: male  : 1980     DATE: 9/15/2023     Assessment and Plan:     Problem List Items Addressed This Visit        Other    Attention deficit hyperactivity disorder (ADHD)     Pt stable on present care. Check labs. Recheck 1y         Relevant Medications    methylphenidate (CONCERTA) 54 MG ER tablet    Other Relevant Orders    CBC and differential    Comprehensive metabolic panel    TSH, 3rd generation with Free T4 reflex    Recurrent major depressive disorder, in partial remission ([address])     Remains well controlled. Continue present care. Check labs. Recheck 1y         Relevant Medications    methylphenidate (CONCERTA) 54 MG ER tablet    Other Relevant Orders    CBC and differential    Comprehensive metabolic panel    TSH, 3rd generation with Free T4 reflex   Other Visit Diagnoses     Annual physical exam    -  Primary    Screening for cardiovascular, respiratory, and genitourinary diseases        Relevant Orders    Comprehensive metabolic panel    Lipid panel          Immunizations and preventive care screenings were discussed with patient today. Appropriate education was printed on patient's after visit summary. Counseling:  Exercise: the importance of regular exercise/physical activity was discussed. Recommend exercise 3-5 times per week for at least 30 minutes. Return in about 1 year (around 9/15/2024). Chief Complaint:     Chief Complaint   Patient presents with   • Annual Exam      History of Present Illness:     Adult Annual Physical   Patient here for a comprehensive physical exam. The patient reports problems - as below. - pt finds that focus is stable. Pt compliant with Concerta  - mood stable  - still drinking 1-3beers a day over several hours. Denies getting intoxicated    Diet and Physical Activity  Diet/Nutrition: well balanced diet.    Exercise: moderate cardiovascular exercise, 1-2 times a week on average and 1-2 hours on average. Depression Screening  PHQ-2/9 Depression Screening    Little interest or pleasure in doing things: 0 - not at all  Feeling down, depressed, or hopeless: 0 - not at all  Trouble falling or staying asleep, or sleeping too much: 0 - not at all  Feeling tired or having little energy: 1 - several days  Poor appetite or overeatin - not at all  Feeling bad about yourself - or that you are a failure or have let yourself or your family down: 0 - not at all  Trouble concentrating on things, such as reading the newspaper or watching television: 0 - not at all  Moving or speaking so slowly that other people could have noticed. Or the opposite - being so fidgety or restless that you have been moving around a lot more than usual: 0 - not at all  Thoughts that you would be better off dead, or of hurting yourself in some way: 0 - not at all  PHQ-9 Score: 1   PHQ-9 Interpretation: No or Minimal depression        General Health  Sleep: sleeps well and gets 7-8 hours of sleep on average. Hearing: normal - bilateral.  Vision: no vision problems and most recent eye exam <1 year ago. Dental: regular dental visits and brushes teeth twice daily.  Health  Symptoms include: none     Review of Systems:     Review of Systems   Constitutional: Negative. HENT: Negative. Eyes: Negative. Respiratory: Negative. Cardiovascular: Negative. Gastrointestinal: Negative. Endocrine: Negative. Genitourinary: Negative. Musculoskeletal: Negative. Skin: Negative. Allergic/Immunologic: Negative. Neurological: Negative. Hematological: Negative. Psychiatric/Behavioral: Negative.        Past Medical History:     Past Medical History:   Diagnosis Date   • Bilateral carpal tunnel syndrome     Last Assessed 2015      Past Surgical History:     Past Surgical History:   Procedure Laterality Date   • [place] NEUROPLASTY &/TRANSPOS MEDIAN NRV CARPAL PAWAN Left 10/25/2016    Procedure: CARPAL TUNNEL RELEASE ;  Surgeon: Dorota Jean DO;  [facility];  Service: Orthopedics   • [place] NEUROPLASTY &/TRANSPOS MEDIAN NRV CARPAL Shelbie Stoddard Right 3/22/2016    Procedure: CARPAL TUNNEL RELEASE ;  Surgeon: Dorota Jean DO;  [facility];  Service: Orthopedics   • VASECTOMY      Vas Deferens      Family History:     Family History   Problem Relation Age of Onset   • Hypertension Father         Essential      Social History:     Social History     Socioeconomic History   • Marital status: Single     Spouse name: None   • Number of children: None   • Years of education: None   • Highest education level: None   Occupational History     Employer: [employer]     Comment: Full-Time Employment   Tobacco Use   • Smoking status: Former   • Smokeless tobacco: Never   Substance and Sexual Activity   • Alcohol use:  Yes     Alcohol/week: 3.0 standard drinks of alcohol     Types: 3 Cans of beer per week     Comment: every other day; Social   • Drug use: No   • Sexual activity: Yes   Other Topics Concern   • None   Social History Narrative    Always uses seat belt    Daily Coffee Consumption    Dental Care, Regularly    Exercises regularly    Multiple organ donor    No guns in the home    No Living will    Denied Tea    Denied Power of  in existence    Water intake, adequate(per day)     Social Determinants of Health     Financial Resource Strain: Not on file   Food Insecurity: Not on file   Transportation Needs: Not on file   Physical Activity: Not on file   Stress: Not on file   Social Connections: Not on file   Intimate Partner Violence: Not on file   Housing Stability: Not on file      Current Medications:     Current Outpatient Medications   Medication Sig Dispense Refill   • DULoxetine (CYMBALTA) 60 mg delayed release capsule TAKE 1 CAPSULE BY MOUTH EVERY DAY 30 capsule 5   • methylphenidate (CONCERTA) 54 MG ER tablet Take 1 tablet (54 mg total) by mouth daily Max Daily Amount: 54 mg 30 tablet 0   • sildenafil (VIAGRA) 50 MG tablet TAKE 1 TABLET BY MOUTH AS NEEDED FOR ERECTILE DYSFUNCTION 10 tablet 1     No current facility-administered medications for this visit. Allergies:     No Known Allergies   Physical Exam:     /82 (BP Location: Left arm, Patient Position: Sitting, Cuff Size: Adult)   Pulse 82   Temp (!) 96.2 °F (35.7 °C)   Ht 6' 1" (1.854 m)   Wt 93 kg (205 lb)   SpO2 98%   BMI 27.05 kg/m²     Physical Exam  Vitals reviewed. Constitutional:       Appearance: He is well-developed. HENT:      Head: Normocephalic and atraumatic. Right Ear: Tympanic membrane, ear canal and external ear normal.      Left Ear: Tympanic membrane, ear canal and external ear normal.      Nose: Nose normal.      Mouth/Throat:      Mouth: Mucous membranes are moist.   Eyes:      Extraocular Movements: Extraocular movements intact. Conjunctiva/sclera: Conjunctivae normal.      Pupils: Pupils are equal, round, and reactive to light. Neck:      Thyroid: No thyromegaly. Vascular: No JVD. Cardiovascular:      Rate and Rhythm: Normal rate and regular rhythm. Pulses: Normal pulses. Heart sounds: Normal heart sounds. No murmur heard. Pulmonary:      Effort: Pulmonary effort is normal. No respiratory distress. Breath sounds: Normal breath sounds. No wheezing or rales. Abdominal:      General: Bowel sounds are normal. There is no distension. Palpations: Abdomen is soft. There is no mass. Tenderness: There is no abdominal tenderness. Musculoskeletal:         General: No swelling, tenderness or deformity. Normal range of motion. Cervical back: Normal range of motion and neck supple. No tenderness. No muscular tenderness. Right lower leg: No edema. Left lower leg: No edema. Lymphadenopathy:      Cervical: No cervical adenopathy. Skin:     General: Skin is warm.       Capillary Refill: Capillary refill takes less than 2 seconds. Neurological:      Mental Status: He is alert and oriented to person, place, and time. Cranial Nerves: No cranial nerve deficit. Sensory: No sensory deficit. Motor: No weakness or abnormal muscle tone. Coordination: Coordination normal.      Gait: Gait normal.      Deep Tendon Reflexes: Reflexes normal.   Psychiatric:         Mood and Affect: Mood normal.         Behavior: Behavior normal.         Thought Content:  Thought content normal.         Judgment: Judgment normal.          María Johnson MD  [address]

## 2023-10-20 DIAGNOSIS — N52.2 DRUG-INDUCED ERECTILE DYSFUNCTION: ICD-10-CM

## 2023-10-20 DIAGNOSIS — F90.0 ATTENTION DEFICIT HYPERACTIVITY DISORDER (ADHD), PREDOMINANTLY INATTENTIVE TYPE: ICD-10-CM

## 2023-10-20 RX ORDER — METHYLPHENIDATE HYDROCHLORIDE 54 MG/1
TABLET, EXTENDED RELEASE ORAL
Qty: 30 TABLET | Refills: 0 | Status: SHIPPED | OUTPATIENT
Start: 2023-10-20

## 2023-10-20 RX ORDER — SILDENAFIL 50 MG/1
TABLET, FILM COATED ORAL
Qty: 10 TABLET | Refills: 1 | Status: SHIPPED | OUTPATIENT
Start: 2023-10-20

## 2023-10-20 NOTE — TELEPHONE ENCOUNTER
[id number] 09/15/2023 09/15/2023 Methylphenidate Hcl (Tablet, Extended Release) 30.0 30 54 MG NA DULCEER,M, POGODZINSKI [employer] [place] [address] [id number] 08/22/2023 08/22/2023 Methylphenidate Hcl (Tablet, Extended Release) 30.0 30 54 MG NA ALYXOPHER,M, POGODZINSKI [employer] [facility] / [facility] [place] [address] [id number] 07/24/2023 07/24/2023 Methylphenidate Hcl (Tablet, Extended Release) 30.0 30 54 MG NA TOAN ROSALEST [employer] / [facility] [place] [address] [id number] 06/21/2023 06/21/2023 Methylphenidate Hcl (Tablet, Extended Release) 30.0 30 54 MG NA ALYXOPHER,M, POGODZINSKI [employer] [facility] / 0 [place] [address] [id number] 05/23/2023 05/22/2023 Methylphenidate Hcl (Tablet, Extended Release) 30.0 30 54 MG NA ALYXOPHER,M, POGODZINSKI [employer] / 0 [place] [address] [id number] 04/18/2023 04/18/2023 Methylphenidate Hcl (Tablet, Extended Release) 30.0 30 54 MG NA ALYXOPHER,M, POGODZINSKI [employer] [facility] / 0 [place] [address] [id number] 03/17/2023 03/06/2023 Methylphenidate Hcl (Tablet, Extended Release) 30.0 30 54 MG NA ALYXOPHER,M, POGODZINSKI [employer] [facility] / 0 [place] [address] [id number] 02/17/2023 02/17/2023 Methylphenidate Hcl (Tablet, Extended Release) 30.0 30 54 MG NA ALYXOPHER,M, POGODZINSKI [employer] / 0 [place] [address] [id number] 01/18/2023 01/12/2023 Methylphenidate Hcl (Tablet, Extended Release) 30.0 30 54 MG NA JESSICA RAMIREZ POGODZINSKI [employer]. [employer] 0 / 0 [place]    1 [id number] 12/19/2022 12/19/2022 Methylphenidate Hcl (Tablet, Extended Release) 30.0 30 54 MG NA JESSICA RAMIREZ POGODZINSKI [employer].  [employer] 0 / [place]

## 2023-11-21 DIAGNOSIS — F90.0 ATTENTION DEFICIT HYPERACTIVITY DISORDER (ADHD), PREDOMINANTLY INATTENTIVE TYPE: ICD-10-CM

## 2023-11-21 NOTE — TELEPHONE ENCOUNTER
[id number] 10/20/2023 10/20/2023 Methylphenidate Hcl (Tablet, Extended Release) 30.0 30 54 MG NA TOAN BROADT [employer] [facility] [employer] / 0 [place]    1 [id number] 09/15/2023 09/15/2023 Methylphenidate Hcl (Tablet, Extended Release) 30.0 30 54 MG NA CHRISTOPHER,M, POGODZINSKI [employer] [facility] 0 / 0 [place] [address] [id number] 08/22/2023 08/22/2023 Methylphenidate Hcl (Tablet, Extended Release) 30.0 30 54 MG NA CHRISTOPHER,M, POGODZINSKI [employer] [facility] [place] [address] [id number] 07/24/2023 07/24/2023 Methylphenidate Hcl (Tablet, Extended Release) 30.0 30 54 MG NA TOAN BROADT [employer] [place] [address] [id number] 06/21/2023 06/21/2023 Methylphenidate Hcl (Tablet, Extended Release) 30.0 30 54 MG NA CHRISTOPHER,M, POGODZINSKI [employer] [place] [address] [id number] 05/23/2023 05/22/2023 Methylphenidate Hcl (Tablet, Extended Release) 30.0 30 54 MG NA CHRISTOPHER,M, POGODZINSKI [employer] [place] [address] [id number] 04/18/2023 04/18/2023 Methylphenidate Hcl (Tablet, Extended Release) 30.0 30 54 MG NA CHRISTOPHER,M, POGODZINSKI [employer] [facility] / 0 [place] [address] [id number] 03/17/2023 03/06/2023 Methylphenidate Hcl (Tablet, Extended Release) 30.0 30 54 MG NA CHRISTOPHER,M, POGODZINSKI [employer] / 0 [place] [address] [id number] 02/17/2023 02/17/2023 Methylphenidate Hcl (Tablet, Extended Release) 30.0 30 54 MG NA ALYXOPHER,M, ALEXANDRA Love. [employer] 0 / 0 [place]    1 [id number] 01/18/2023 01/12/2023 Methylphenidate Hcl (Tablet, Extended Release) 30.0 30 54 MG NA JESSICA RAMIREZ, ALEXANDRA Love.  [employer] 0 / 0

## 2023-11-22 RX ORDER — METHYLPHENIDATE HYDROCHLORIDE 54 MG/1
TABLET, EXTENDED RELEASE ORAL
Qty: 30 TABLET | Refills: 0 | Status: SHIPPED | OUTPATIENT
Start: 2023-11-22

## 2023-12-20 DIAGNOSIS — F90.0 ATTENTION DEFICIT HYPERACTIVITY DISORDER (ADHD), PREDOMINANTLY INATTENTIVE TYPE: ICD-10-CM

## 2023-12-20 NOTE — TELEPHONE ENCOUNTER
Refill must be reviewed and completed by the office or provider. The refill is unable to be approved by the medication management team.    Cannot be delegated

## 2023-12-21 RX ORDER — METHYLPHENIDATE HYDROCHLORIDE 54 MG/1
TABLET, EXTENDED RELEASE ORAL
Qty: 30 TABLET | Refills: 0 | Status: SHIPPED | OUTPATIENT
Start: 2023-12-21

## 2023-12-21 NOTE — TELEPHONE ENCOUNTER
1 [id number] 11/22/2023 11/22/2023 Methylphenidate Hcl (Tablet, Extended Release) 30.0 30 54 MG NA JESSICA RAMIREZ STAS [employer], [employer]. Commercial Insurance 0 / 0 PA    1 [id number] 10/20/2023 10/20/2023 Methylphenidate Hcl (Tablet, Extended Release) 30.0 30 54 MG NA TOAN [employer], [employer]. Commercial Insurance 0 / 0 PA    1 [id number] 09/15/2023 09/15/2023 Methylphenidate Hcl (Tablet, Extended Release) 30.0 30 54 MG NA JESSICA RAMIREZ [facility] [employer], [employer]. Commercial Insurance 0 / 0 PA    1 [id number] 08/22/2023 08/22/2023 Methylphenidate Hcl (Tablet, Extended Release) 30.0 30 54 MG NA JESSICA RAMIREZ [place] [employer], [employer]. Commercial Insurance 0 / 0 PA    1 [id number] 07/24/2023 07/24/2023 Methylphenidate Hcl (Tablet, Extended Release) 30.0 30 54 MG NA TOAN [employer], [employer]. Commercial Insurance 0 / 0 PA    1 [id number] 06/21/2023 06/21/2023 Methylphenidate Hcl (Tablet, Extended Release) 30.0 30 54 MG NA JESSICA RAMIREZ STAS [employer] LearningBlanch[facility] Health SystemPuf[facility], [employer]. Commercial Insurance 0 / 0 PA    1 [id number] 05/23/2023 05/22/2023 Methylphenidate Hcl (Tablet, Extended Release) 30.0 30 54 MG NA JESSICA RAMIREZ STAS [employer], [employer]. Commercial Insurance 0 / 0 PA    1 [id number] 04/18/2023 04/18/2023 Methylphenidate Hcl (Tablet, Extended Release) 30.0 30 54 MG NA JESSICA RAMIREZ [place] [employer] PhysicsEARNEST [employer], [employer]. Commercial Insurance 0 / 0 PA    1 [id number] 03/17/2023 03/06/2023 Methylphenidate Hcl (Tablet, Extended Release) 30.0 30 54 MG NA CHRISTOPHER,M, [employer], [employer]. Commercial Insurance 0 / 0 PA    1 [id number] 02/17/2023 02/17/2023 Methylphenidate Hcl (Tablet, Extended Release) 30.0 30 54 MG NA JESSICA RAMIREZ, AseptiaROSEY [employer], [employer]. Commercial Insurance 0 / 0 PA

## 2023-12-27 ENCOUNTER — TELEPHONE (OUTPATIENT)
Age: 43
End: 2023-12-27

## 2023-12-27 NOTE — TELEPHONE ENCOUNTER
Patient called in for same day appt was unable to schedule any as he feels sick need to be seen today. If any accomodation can be made please call back the patient at his mobile. Thanks.

## 2023-12-28 ENCOUNTER — OFFICE VISIT (OUTPATIENT)
Dept: FAMILY MEDICINE CLINIC | Facility: CLINIC | Age: 43
End: 2023-12-28
Payer: COMMERCIAL

## 2023-12-28 VITALS
DIASTOLIC BLOOD PRESSURE: 78 MMHG | SYSTOLIC BLOOD PRESSURE: 126 MMHG | WEIGHT: 208 LBS | HEART RATE: 88 BPM | HEIGHT: 73 IN | TEMPERATURE: 97.2 F | OXYGEN SATURATION: 98 % | BODY MASS INDEX: 27.57 KG/M2

## 2023-12-28 DIAGNOSIS — R68.89 FLU-LIKE SYMPTOMS: Primary | ICD-10-CM

## 2023-12-28 DIAGNOSIS — R05.9 COUGH, UNSPECIFIED TYPE: ICD-10-CM

## 2023-12-28 LAB
SARS-COV-2 AG UPPER RESP QL IA: POSITIVE
SL AMB POCT RAPID FLU A: NEGATIVE
SL AMB POCT RAPID FLU B: NEGATIVE
VALID CONTROL: ABNORMAL

## 2023-12-28 PROCEDURE — 99214 OFFICE O/P EST MOD 30 MIN: CPT | Performed by: NURSE PRACTITIONER

## 2023-12-28 PROCEDURE — 87811 SARS-COV-2 COVID19 W/OPTIC: CPT | Performed by: NURSE PRACTITIONER

## 2023-12-28 PROCEDURE — 87804 INFLUENZA ASSAY W/OPTIC: CPT | Performed by: NURSE PRACTITIONER

## 2023-12-28 NOTE — PROGRESS NOTES
COVID-19 Outpatient Progress Note    Assessment/Plan:    Problem List Items Addressed This Visit    None  Visit Diagnoses     Flu-like symptoms    -  Primary    Relevant Orders    POCT rapid flu A and B (Completed)    Cough, unspecified type        Relevant Orders    POCT Rapid Covid Ag (Completed)         Disposition:     Rapid antigen testing was performed and the result is POSITIVE for COVID-19.     Discussed symptom directed medication options with patient. Discussed vitamin D, vitamin C, and/or zinc supplementation with patient.     Recommendation of 5 days self isolation followed by 5 days of masking afterwards    I have spent a total time of 15 minutes on the day of the encounter for this patient including discussing diagnostic results, discussing prognosis, risks and benefits of treatment options, instructions for management, patient and family education, importance of treatment compliance, risk factor reductions, impressions, counseling/coordination of care, documenting in the medical record, reviewing/ordering tests, medicine, procedures and obtaining or reviewing history.       Encounter provider: ERIKA Cooper     Provider located at: [address] [address]     Recent Visits  No visits were found meeting these conditions.  Showing recent visits within past 7 days and meeting all other requirements  Today's Visits  Date Type Provider Dept   12/28/23 Office Visit ERIKA Cooper [facility]   Showing today's visits and meeting all other requirements  Future Appointments  No visits were found meeting these conditions.  Showing future appointments within next 150 days and meeting all other requirements     Subjective:   Pardeep Sanchez is a 43 y.o. male who is concerned about COVID-19. Patient's symptoms include fatigue, nasal congestion (yellow mucus), rhinorrhea, sore throat, cough and myalgias. Patient denies fever, chills,  anosmia, loss of taste, shortness of breath, chest tightness, abdominal pain, nausea, vomiting, diarrhea and headaches.     - Date of symptom onset: 12/26/2023      COVID-19 vaccination status: Fully vaccinated (primary series)    Exposure:   Contact with a person who is under investigation (PUI) for or who is positive for COVID-19 within the last 14 days?: No    Hospitalized recently for fever and/or lower respiratory symptoms?: No      Currently a healthcare worker that is involved in direct patient care?: No      Works in a special setting where the risk of COVID-19 transmission may be high? (this may include long-term care, correctional and [facility] facilities; homeless shelters; assisted-living facilities and group homes.): No      Resident in a special setting where the risk of COVID-19 transmission may be high? (this may include long-term care, correctional and [facility] facilities; homeless shelters; assisted-living facilities and group homes.): No      He had COVID-19 two years ago and he felt the same as this week.      Lab Results   Component Value Date    SARSCOVAG Positive (A) 12/28/2023       Review of Systems   Constitutional:  Positive for fatigue. Negative for chills and fever.   HENT:  Positive for congestion (yellow mucus), rhinorrhea, sinus pressure and sore throat. Negative for ear pain, postnasal drip and sinus pain.    Respiratory:  Positive for cough. Negative for chest tightness, shortness of breath and wheezing.    Cardiovascular: Negative.    Gastrointestinal:  Negative for abdominal pain, diarrhea, nausea and vomiting.   Musculoskeletal:  Positive for myalgias.   Neurological:  Negative for dizziness, light-headedness and headaches.     Current Outpatient Medications on File Prior to Visit   Medication Sig   • DULoxetine (CYMBALTA) 60 mg delayed release capsule TAKE 1 CAPSULE BY MOUTH EVERY DAY   • Methylphenidate HCl ER 54 MG TB24 TAKE 1 TABLET BY MOUTH EVERY DAY MAXIMUM DAILY DOSE: 1  "TABLET   • sildenafil (VIAGRA) 50 MG tablet TAKE 1 TABLET BY MOUTH AS NEEDED FOR ERECTILE DYSFUNCTION       Objective:    /78 (BP Location: Right arm, Patient Position: Sitting, Cuff Size: Large)   Pulse 88   Temp (!) 97.2 °F (36.2 °C)   Ht 6' 1\" (1.854 m)   Wt 94.3 kg (208 lb)   SpO2 98%   BMI 27.44 kg/m²  (Reviewed)      Physical Exam  Vitals reviewed.   Constitutional:       General: He is not in acute distress.     Appearance: He is not ill-appearing.   HENT:      Head: Normocephalic and atraumatic.      Right Ear: Tympanic membrane, ear canal and external ear normal.      Left Ear: Tympanic membrane, ear canal and external ear normal.      Nose: Congestion present.      Mouth/Throat:      Mouth: Mucous membranes are moist.      Pharynx: Oropharynx is clear. No posterior oropharyngeal erythema.   Eyes:      Extraocular Movements: Extraocular movements intact.      Conjunctiva/sclera: Conjunctivae normal.      Pupils: Pupils are equal, round, and reactive to light.   Cardiovascular:      Rate and Rhythm: Normal rate and regular rhythm.      Heart sounds: Normal heart sounds.   Pulmonary:      Effort: Pulmonary effort is normal.      Breath sounds: Normal breath sounds.   Musculoskeletal:      Cervical back: Neck supple.   Lymphadenopathy:      Cervical: No cervical adenopathy.   Skin:     General: Skin is warm and dry.      Capillary Refill: Capillary refill takes less than 2 seconds.   Neurological:      Mental Status: He is alert and oriented to person, place, and time.   Psychiatric:         Mood and Affect: Mood normal.         Behavior: Behavior normal.         BMI Counseling: Body mass index is 27.44 kg/m². The BMI is above normal. Nutrition recommendations include decreasing portion sizes, encouraging healthy choices of fruits and vegetables, consuming healthier snacks, moderation in carbohydrate intake and increasing intake of lean protein. Exercise recommendations include exercising 3-5 times " per week and strength training exercises. Rationale for BMI follow-up plan is due to patient being overweight or obese.       ERIKA Cooper

## 2023-12-28 NOTE — LETTER
December 28, 2023     Patient: Pardeep Sanchez   YOB: 1980   Date of Visit: 12/28/2023       To Whom it May Concern:    Pardeep Sanchez was seen in the office on 12/28/2023. He may return to work on 12/28/2023 .    If you have any questions or concerns, please don't hesitate to call.         Sincerely,          ERIKA Cooper        CC: No Recipients

## 2024-01-24 DIAGNOSIS — F90.0 ATTENTION DEFICIT HYPERACTIVITY DISORDER (ADHD), PREDOMINANTLY INATTENTIVE TYPE: ICD-10-CM

## 2024-01-24 RX ORDER — METHYLPHENIDATE HYDROCHLORIDE 54 MG/1
TABLET, EXTENDED RELEASE ORAL
Qty: 30 TABLET | Refills: 0 | Status: SHIPPED | OUTPATIENT
Start: 2024-01-24

## 2024-01-24 NOTE — TELEPHONE ENCOUNTER
1 [id number] 12/21/2023 12/21/2023 Methylphenidate Hcl (Tablet, Extended Release) 30.0 30 54 MG NA JESSICA [facility]Z [facility] [employer], [employer]. Commercial Insurance 0 / 0 PA    1 [id number] 11/22/2023 11/22/2023 Methylphenidate Hcl (Tablet, Extended Release) 30.0 30 54 MG NA JESSICA RAMIREZ [facility] HospitalDE[facility]RIONS [employer], [employer]. Commercial Insurance 0 / 0 PA    1 [id number] 10/20/2023 10/20/2023 Methylphenidate Hcl (Tablet, Extended Release) 30.0 30 54 MG NA TOAN [employer]. Commercial Insurance 0 / 0 PA    1 [id number] 09/15/2023 09/15/2023 Methylphenidate Hcl (Tablet, Extended Release) 30.0 30 54 MG NA JESSICA [facility]Z [facility] [employer], [employer]. Commercial Insurance 0 / 0 PA    1 [id number] 08/22/2023 08/22/2023 Methylphenidate Hcl (Tablet, Extended Release) 30.0 30 54 MG NA JESSICA RAMIREZ [facility] [employer], [employer]. Commercial Insurance 0 / 0 PA    1 [id number] 07/24/2023 07/24/2023 Methylphenidate Hcl (Tablet, Extended Release) 30.0 30 54 MG NA TOAN [employer], [employer]. Commercial Insurance 0 / 0 PA    1 [id number] 06/21/2023 06/21/2023 Methylphenidate Hcl (Tablet, Extended Release) 30.0 30 54 MG NA JESSICA RAMIREZ KETTY [employer], [employer]. Commercial Insurance 0 / 0 PA    1 [id number] 05/23/2023 05/22/2023 Methylphenidate Hcl (Tablet, Extended Release) 30.0 30 54 MG NA JESSICA RAMIREZ [facility] [employer], [employer]. Commercial Insurance 0 / 0 PA    1 [id number] 04/18/2023 04/18/2023 Methylphenidate Hcl (Tablet, Extended Release) 30.0 30 54 MG NA JESSICA RAMIREZ [employer], [facility]. Commercial Insurance 0 / 0 PA    1 [id number] 03/17/2023 03/06/2023 Methylphenidate Hcl (Tablet, Extended Release) 30.0 30 54 MG NA JESSICA RAMIREZ, [employer], [facility]. Commercial Insurance 0 / 0 PA

## 2024-02-12 DIAGNOSIS — F33.0 MILD EPISODE OF RECURRENT MAJOR DEPRESSIVE DISORDER (HCC): ICD-10-CM

## 2024-02-12 RX ORDER — DULOXETIN HYDROCHLORIDE 60 MG/1
CAPSULE, DELAYED RELEASE ORAL
Qty: 30 CAPSULE | Refills: 5 | Status: SHIPPED | OUTPATIENT
Start: 2024-02-12

## 2024-02-24 DIAGNOSIS — N52.2 DRUG-INDUCED ERECTILE DYSFUNCTION: ICD-10-CM

## 2024-02-24 DIAGNOSIS — F90.0 ATTENTION DEFICIT HYPERACTIVITY DISORDER (ADHD), PREDOMINANTLY INATTENTIVE TYPE: ICD-10-CM

## 2024-02-24 RX ORDER — SILDENAFIL 50 MG/1
TABLET, FILM COATED ORAL
Qty: 10 TABLET | Refills: 1 | Status: SHIPPED | OUTPATIENT
Start: 2024-02-24

## 2024-02-26 RX ORDER — METHYLPHENIDATE HYDROCHLORIDE 54 MG/1
TABLET, EXTENDED RELEASE ORAL
Qty: 30 TABLET | Refills: 0 | Status: SHIPPED | OUTPATIENT
Start: 2024-02-26

## 2024-02-26 NOTE — TELEPHONE ENCOUNTER
1 [id number] 01/26/2024 01/24/2024 Methylphenidate Hcl (Tablet, Extended Release) 30.0 30 54 MG NA JESSICA RAMIREZ STAS [employer], [employer]. Commercial Insurance 0 / 0 PA    1 [id number] 12/21/2023 12/21/2023 Methylphenidate Hcl (Tablet, Extended Release) 30.0 30 54 MG NA JESSICA RAMIREZ [facility] HospitalDEMETRIOPluroGen TherapeuticsNS [employer], [employer]. Commercial Insurance 0 / 0 PA    1 [id number] 11/22/2023 11/22/2023 Methylphenidate Hcl (Tablet, Extended Release) 30.0 30 54 MG NA JESSICA RAMIREZ KETTY [employer], [employer]. Commercial Insurance 0 / 0 PA    1 [id number] 10/20/2023 10/20/2023 Methylphenidate Hcl (Tablet, Extended Release) 30.0 30 54 MG NA TOAN [employer]. Commercial Insurance 0 / 0 PA    1 [id number] 09/15/2023 09/15/2023 Methylphenidate Hcl (Tablet, Extended Release) 30.0 30 54 MG NA JESSICA RAMIREZ [facility] HospitalESTELITA [employer], [employer]. Commercial Insurance 0 / 0 PA    1 [id number] 08/22/2023 08/22/2023 Methylphenidate Hcl (Tablet, Extended Release) 30.0 30 54 MG NA JESSICA RAMIREZ "[employer]DEMETRIONS [employer], [employer]. Commercial Insurance 0 / 0 PA    1 [id number] 07/24/2023 07/24/2023 Methylphenidate Hcl (Tablet, Extended Release) 30.0 30 54 MG NA TOAN [employer], [employer]. Commercial Insurance 0 / 0 PA    1 [id number] 06/21/2023 06/21/2023 Methylphenidate Hcl (Tablet, Extended Release) 30.0 30 54 MG NA JESSICA RAMIREZ "[employer]EARNEST [employer], [employer]. Commercial Insurance 0 / 0 PA    1 [id number] 05/23/2023 05/22/2023 Methylphenidate Hcl (Tablet, Extended Release) 30.0 30 54 MG NA JESSICA RAMIREZ [employer]. Commercial Insurance 0 / 0 PA    1 [id number] 04/18/2023 04/18/2023 Methylphenidate Hcl (Tablet, Extended Release) 30.0 30 54 MG NA JESSICA RAMIREZ, [employer]. Commercial Insurance 0 / 0 PA

## 2024-04-15 DIAGNOSIS — F90.0 ATTENTION DEFICIT HYPERACTIVITY DISORDER (ADHD), PREDOMINANTLY INATTENTIVE TYPE: ICD-10-CM

## 2024-04-15 RX ORDER — METHYLPHENIDATE HYDROCHLORIDE 54 MG/1
TABLET ORAL
Qty: 30 TABLET | Refills: 0 | Status: SHIPPED | OUTPATIENT
Start: 2024-04-15

## 2024-04-15 NOTE — TELEPHONE ENCOUNTER
1 [id number] 02/27/2024 02/26/2024 Methylphenidate Hcl (Tablet, Extended Release) 30.0 30 54 MG NA RAYNA MANUEL [employer], [employer]. Commercial Insurance 0 / 0 PA    1 [id number] 01/26/2024 01/24/2024 Methylphenidate Hcl (Tablet, Extended Release) 30.0 30 54 MG NA JESSICA RAMIREZ [facility] CarmelDEMETRIONSScotty Gear, [employer]. Commercial Insurance 0 / 0 PA    1 [id number] 12/21/2023 12/21/2023 Methylphenidate Hcl (Tablet, Extended Release) 30.0 30 54 MG NA JESSICA RAMIREZ [facility] CarmelDEMETRIONS [employer], [employer]. Commercial Insurance 0 / 0 PA    1 [id number] 11/22/2023 11/22/2023 Methylphenidate Hcl (Tablet, Extended Release) 30.0 30 54 MG NA JESSICA RAMIREZ [employer]AcademyDameron HospitalScotty [employer], [employer]. Commercial Insurance 0 / 0 PA    1 [id number] 10/20/2023 10/20/2023 Methylphenidate Hcl (Tablet, Extended Release) 30.0 30 54 MG NA TOAN [employer], [employer]. Commercial Insurance 0 / 0 PA    1 [id number] 09/15/2023 09/15/2023 Methylphenidate Hcl (Tablet, Extended Release) 30.0 30 54 MG NA JESSICA RAMIREZ KETTY [employer], [employer]. Commercial Insurance 0 / 0 PA    1 [id number] 08/22/2023 08/22/2023 Methylphenidate Hcl (Tablet, Extended Release) 30.0 30 54 MG NA JESSICA RAMIREZ STAS [employer], [employer]. Commercial Insurance 0 / 0 PA    1 [id number] 07/24/2023 07/24/2023 Methylphenidate Hcl (Tablet, Extended Release) 30.0 30 54 MG NA TOAN [employer], [employer]. Commercial Insurance 0 / 0 PA    1 [id number] 06/21/2023 06/21/2023 Methylphenidate Hcl (Tablet, Extended Release) 30.0 30 54 MG NA JESSICA RAMIREZ [facility] CarmelDEMETRIONS [employer], [employer]. Commercial Insurance 0 / 0 PA    1 [id number] 05/23/2023 05/22/2023 Methylphenidate Hcl (Tablet, Extended Release) 30.0 30 54 MG NA JESSICA RAMIREZ POGODZINSKI [facility] [employer] Commercial Insurance 0 / 0 PA

## 2024-05-30 DIAGNOSIS — F90.0 ATTENTION DEFICIT HYPERACTIVITY DISORDER (ADHD), PREDOMINANTLY INATTENTIVE TYPE: ICD-10-CM

## 2024-05-31 RX ORDER — METHYLPHENIDATE HYDROCHLORIDE 54 MG/1
TABLET ORAL
Qty: 30 TABLET | Refills: 0 | Status: SHIPPED | OUTPATIENT
Start: 2024-05-31

## 2024-05-31 NOTE — TELEPHONE ENCOUNTER
1 [id number] 04/15/2024 04/15/2024 Methylphenidate Hcl (Tablet, Extended Release) 30.0 30 54 MG NA JESSICA RAMIREZ STAS [employer], [employer]. Commercial Insurance 0 / 0 PA    1 [id number] 02/27/2024 02/26/2024 Methylphenidate Hcl (Tablet, Extended Release) 30.0 30 54 MG NA RAYNA MANUEL [employer], [employer]. Commercial Insurance 0 / 0 PA    1 [id number] 01/26/2024 01/24/2024 Methylphenidate Hcl (Tablet, Extended Release) 30.0 30 54 MG NA JESSICA RAMIREZ [facility]ESTELITATrailburning. Commercial Insurance 0 / 0 PA    1 [id number] 12/21/2023 12/21/2023 Methylphenidate Hcl (Tablet, Extended Release) 30.0 30 54 MG NA JESSICA RAMIREZ [employer], [employer]. Commercial Insurance 0 / 0 PA    1 [id number] 11/22/2023 11/22/2023 Methylphenidate Hcl (Tablet, Extended Release) 30.0 30 54 MG NA JESSICA RAMIREZ Arteriocyte Medical SystemsDEMETRIOSaddleback Memorial Medical CenterOnline Agility, [employer]. Commercial Insurance 0 / 0 PA    1 [id number] 10/20/2023 10/20/2023 Methylphenidate Hcl (Tablet, Extended Release) 30.0 30 54 MG NA [employer]. Commercial Insurance 0 / 0 PA    1 [id number] 09/15/2023 09/15/2023 Methylphenidate Hcl (Tablet, Extended Release) 30.0 30 54 MG NA JESSICA RAMIREZ KETTY [employer], [employer]. Commercial Insurance 0 / 0 PA    1 [id number] 08/22/2023 08/22/2023 Methylphenidate Hcl (Tablet, Extended Release) 30.0 30 54 MG NA JESSICA RAMIREZ Arteriocyte Medical SystemsThe Institute of LivingNS [employer], [employer]. Commercial Insurance 0 / 0 PA    1 [id number] 07/24/2023 07/24/2023 Methylphenidate Hcl (Tablet, Extended Release) 30.0 30 54 MG NA [employer]. Commercial Insurance 0 / 0 PA    1 [id number] 06/21/2023 06/21/2023 Methylphenidate Hcl (Tablet, Extended Release) 30.0 30 54 MG NA JESSICA RAMIREZ POGODZINSKI [facility] [employer]Fr[employer] Commercial Insurance 0 / 0 PA

## 2024-07-03 DIAGNOSIS — F90.0 ATTENTION DEFICIT HYPERACTIVITY DISORDER (ADHD), PREDOMINANTLY INATTENTIVE TYPE: ICD-10-CM

## 2024-07-03 RX ORDER — METHYLPHENIDATE HYDROCHLORIDE 54 MG/1
TABLET ORAL
Qty: 30 TABLET | Refills: 0 | Status: SHIPPED | OUTPATIENT
Start: 2024-07-03

## 2024-07-03 NOTE — TELEPHONE ENCOUNTER
1 [id number] 05/31/2024 05/31/2024 Methylphenidate Hcl (Tablet, Extended Release) 30.0 30 54 MG NA JESSICA RAMIREZ KETTY [employer], [employer]. Commercial Insurance 0 / 0 PA    1 [id number] 04/15/2024 04/15/2024 Methylphenidate Hcl (Tablet, Extended Release) 30.0 30 54 MG NA JESSICA RAMIREZ KETTY [employer], [employer]. Commercial Insurance 0 / 0 PA    1 [id number] 02/27/2024 02/26/2024 Methylphenidate Hcl (Tablet, Extended Release) 30.0 30 54 MG NA RAYNA MANUEL [employer], [employer]. Commercial Insurance 0 / 0 PA    1 [id number] 01/26/2024 01/24/2024 Methylphenidate Hcl (Tablet, Extended Release) 30.0 30 54 MG NA JESSICA RAMIREZ [facility] [employer], [employer]. Commercial Insurance 0 / 0 PA    1 [id number] 12/21/2023 12/21/2023 Methylphenidate Hcl (Tablet, Extended Release) 30.0 30 54 MG NA JESSICA RAMIREZ KETTY [employer], [employer]. Commercial Insurance 0 / 0 PA    1 [id number] 11/22/2023 11/22/2023 Methylphenidate Hcl (Tablet, Extended Release) 30.0 30 54 MG NA JESSICA RAMIREZ [facility] CrawfordsvilleDEMETRIOGuadalupe [facility] Hospital [employer], [employer]. Commercial Insurance 0 / 0 PA    1 [id number] 10/20/2023 10/20/2023 Methylphenidate Hcl (Tablet, Extended Release) 30.0 30 54 MG NA TOAN CONRAD [employer], [employer]. Commercial Insurance 0 / 0 PA    1 [id number] 09/15/2023 09/15/2023 Methylphenidate Hcl (Tablet, Extended Release) 30.0 30 54 MG NA JESSICA RAMIREZ [facility] CrawfordsvilleESTELITA [employer], [employer]. Commercial Insurance 0 / 0 PA    1 [id number] 08/22/2023 08/22/2023 Methylphenidate Hcl (Tablet, Extended Release) 30.0 30 54 MG NA JESSICA RAMIREZ ALEXANDRA [employer], [employer]. Commercial Insurance 0 / 0 PA    1 [id number] 07/24/2023 07/24/2023 Methylphenidate Hcl (Tablet, Extended Release) 30.0 30 54 MG NA TOAN CONRAD [employer]. Commercial Insurance 0 / 0 PA

## 2024-07-08 DIAGNOSIS — B00.9 HSV (HERPES SIMPLEX VIRUS) INFECTION: Primary | ICD-10-CM

## 2024-07-08 RX ORDER — VALACYCLOVIR HYDROCHLORIDE 1 G/1
1000 TABLET, FILM COATED ORAL 2 TIMES DAILY
Qty: 28 TABLET | Refills: 4 | Status: SHIPPED | OUTPATIENT
Start: 2024-07-08 | End: 2024-07-22

## 2024-08-10 DIAGNOSIS — F90.0 ATTENTION DEFICIT HYPERACTIVITY DISORDER (ADHD), PREDOMINANTLY INATTENTIVE TYPE: ICD-10-CM

## 2024-08-10 DIAGNOSIS — B00.9 HSV (HERPES SIMPLEX VIRUS) INFECTION: ICD-10-CM

## 2024-08-11 RX ORDER — VALACYCLOVIR HYDROCHLORIDE 1 G/1
1000 TABLET, FILM COATED ORAL 2 TIMES DAILY
Qty: 28 TABLET | Refills: 0 | Status: SHIPPED | OUTPATIENT
Start: 2024-08-11 | End: 2024-08-25

## 2024-08-12 RX ORDER — METHYLPHENIDATE HYDROCHLORIDE 54 MG/1
54 TABLET ORAL DAILY
Qty: 30 TABLET | Refills: 0 | Status: SHIPPED | OUTPATIENT
Start: 2024-08-12

## 2024-08-12 NOTE — TELEPHONE ENCOUNTER
1 [id number] 07/03/2024 07/03/2024 Methylphenidate Hcl (Tablet, Extended Release) 30.0 30 54 MG NA JESSICA RAMIREZ STAS [employer], [employer]. Commercial Insurance 0 / 0 PA    1 [id number] 05/31/2024 05/31/2024 Methylphenidate Hcl (Tablet, Extended Release) 30.0 30 54 MG NA JESSICA RAMIREZ KETTY [employer], [employer]. Commercial Insurance 0 / 0 PA    1 [id number] 04/15/2024 04/15/2024 Methylphenidate Hcl (Tablet, Extended Release) 30.0 30 54 MG NA JESSICA RAMIREZ KETTY [employer], [employer]. Commercial Insurance 0 / 0 PA    1 [id number] 02/27/2024 02/26/2024 Methylphenidate Hcl (Tablet, Extended Release) 30.0 30 54 MG NA RAYNA MANUEL [employer], [employer]. Commercial Insurance 0 / 0 PA    1 [id number] 01/26/2024 01/24/2024 Methylphenidate Hcl (Tablet, Extended Release) 30.0 30 54 MG NA JESSICA RAMIREZ KETTY [employer], [employer]. Commercial Insurance 0 / 0 PA    1 [id number] 12/21/2023 12/21/2023 Methylphenidate Hcl (Tablet, Extended Release) 30.0 30 54 MG NA JESSICA RAMIREZ [employer] HoldingsESTELITA [employer], [employer]. Commercial Insurance 0 / 0 PA    1 [id number] 11/22/2023 11/22/2023 Methylphenidate Hcl (Tablet, Extended Release) 30.0 30 54 MG NA JESSICA RAMIREZ STAS [employer], [employer]. Commercial Insurance 0 / 0 PA    1 [id number] 10/20/2023 10/20/2023 Methylphenidate Hcl (Tablet, Extended Release) 30.0 30 54 MG NA TOAN CONRAD [employer], [employer]. Commercial Insurance 0 / 0 PA    1 [id number] 09/15/2023 09/15/2023 Methylphenidate Hcl (Tablet, Extended Release) 30.0 30 54 MG NA JESSICA RAMIREZ [employer]. Commercial Insurance 0 / 0 PA    1 [id number] 08/22/2023 08/22/2023 Methylphenidate Hcl (Tablet, Extended Release) 30.0 30 54 MG NA JESSICA RAMIREZ, [employer]. Commercial Insurance 0 / 0 PA

## 2024-08-23 DIAGNOSIS — F33.0 MILD EPISODE OF RECURRENT MAJOR DEPRESSIVE DISORDER (HCC): ICD-10-CM

## 2024-08-23 RX ORDER — DULOXETIN HYDROCHLORIDE 60 MG/1
CAPSULE, DELAYED RELEASE ORAL
Qty: 30 CAPSULE | Refills: 5 | Status: SHIPPED | OUTPATIENT
Start: 2024-08-23

## 2024-09-13 ENCOUNTER — RA CDI HCC (OUTPATIENT)
Dept: OTHER | Facility: HOSPITAL | Age: 44
End: 2024-09-13

## 2024-09-13 DIAGNOSIS — F90.0 ATTENTION DEFICIT HYPERACTIVITY DISORDER (ADHD), PREDOMINANTLY INATTENTIVE TYPE: ICD-10-CM

## 2024-09-13 RX ORDER — METHYLPHENIDATE HYDROCHLORIDE 54 MG/1
TABLET ORAL
Qty: 30 TABLET | Refills: 0 | Status: SHIPPED | OUTPATIENT
Start: 2024-09-13

## 2024-09-13 NOTE — TELEPHONE ENCOUNTER
1 [id number] 08/12/2024 08/12/2024 Methylphenidate Hcl (Tablet, Extended Release) 30.0 30 54 MG NA JESSICA RAMIREZ STAS "[employer]. Commercial Insurance 0 / 0 PA    1 [id number] 07/03/2024 07/03/2024 Methylphenidate Hcl (Tablet, Extended Release) 30.0 30 54 MG NA JESSICA RAMIREZ KETTY "[employer]. Commercial Insurance 0 / 0 PA    1 [id number] 05/31/2024 05/31/2024 Methylphenidate Hcl (Tablet, Extended Release) 30.0 30 54 MG NA JESSICA RAMIREZ STAS "[employer]. Commercial Insurance 0 / 0 PA    1 [id number] 04/15/2024 04/15/2024 Methylphenidate Hcl (Tablet, Extended Release) 30.0 30 54 MG NA JESSICA RAMIREZ KETTY "[employer]. Commercial Insurance 0 / 0 PA    1 [id number] 02/27/2024 02/26/2024 Methylphenidate Hcl (Tablet, Extended Release) 30.0 30 54 MG NA RAYNA MANUEL "[employer]. Commercial Insurance 0 / 0 PA    1 [id number] 01/26/2024 01/24/2024 Methylphenidate Hcl (Tablet, Extended Release) 30.0 30 54 MG NA JESSICA RAMIREZ [employer]ge AdvisorsESTELITA "[employer]. Commercial Insurance 0 / 0 PA    1 [id number] 12/21/2023 12/21/2023 Methylphenidate Hcl (Tablet, Extended Release) 30.0 30 54 MG NA JESSICA RAMIREZ STAS "[employer]. Commercial Insurance 0 / 0 PA    1 [id number] 11/22/2023 11/22/2023 Methylphenidate Hcl (Tablet, Extended Release) 30.0 30 54 MG NA JESSICA RAMIREZ [facility] Bee[facility] GroveNADIYA "[employer]. Commercial Insurance 0 / 0 PA    1 [id number] 10/20/2023 10/20/2023 Methylphenidate Hcl (Tablet, Extended Release) 30.0 30 54 MG NA TOAN CONRAD "[employer]. Commercial Insurance 0 / 0 PA    1 [id number] 09/15/2023 09/15/2023 Methylphenidate Hcl (Tablet, Extended Release) 30.0 30 54 MG JESSICA WALLER POGODZINSKI "[employer] Commercial Insurance 0 / 0 PA

## 2024-09-13 NOTE — PROGRESS NOTES
HCC coding opportunities          Chart Reviewed number of suggestions sent to Provider: 1  F10.20     Patients Insurance        Commercial Insurance: [employer] Commercial Insurance

## 2024-09-20 ENCOUNTER — OFFICE VISIT (OUTPATIENT)
Dept: FAMILY MEDICINE CLINIC | Facility: CLINIC | Age: 44
End: 2024-09-20

## 2024-09-20 VITALS
DIASTOLIC BLOOD PRESSURE: 78 MMHG | HEART RATE: 83 BPM | OXYGEN SATURATION: 98 % | TEMPERATURE: 97.5 F | WEIGHT: 213.6 LBS | HEIGHT: 73 IN | SYSTOLIC BLOOD PRESSURE: 116 MMHG | BODY MASS INDEX: 28.31 KG/M2

## 2024-09-20 DIAGNOSIS — F33.41 RECURRENT MAJOR DEPRESSIVE DISORDER, IN PARTIAL REMISSION (HCC): ICD-10-CM

## 2024-09-20 DIAGNOSIS — Z00.00 ANNUAL PHYSICAL EXAM: Primary | ICD-10-CM

## 2024-09-20 DIAGNOSIS — Z13.6 SCREENING FOR CARDIOVASCULAR, RESPIRATORY, AND GENITOURINARY DISEASES: ICD-10-CM

## 2024-09-20 DIAGNOSIS — Z13.89 SCREENING FOR CARDIOVASCULAR, RESPIRATORY, AND GENITOURINARY DISEASES: ICD-10-CM

## 2024-09-20 DIAGNOSIS — Z13.83 SCREENING FOR CARDIOVASCULAR, RESPIRATORY, AND GENITOURINARY DISEASES: ICD-10-CM

## 2024-09-20 DIAGNOSIS — F90.0 ATTENTION DEFICIT HYPERACTIVITY DISORDER (ADHD), PREDOMINANTLY INATTENTIVE TYPE: ICD-10-CM

## 2024-09-20 NOTE — PROGRESS NOTES
Adult Annual Physical  Name: Pardeep Sanchez      : 1980      MRN: [id number]  Encounter Provider: Amilcar Pugh MD  Encounter Date: 2024   Encounter department: [facility]    Assessment & Plan  Annual physical exam         Recurrent major depressive disorder, in partial remission (HCC)  Stable on duloxetine.  Present continue present care.  Recheck 6 months  Orders:    TSH, 3rd generation with Free T4 reflex; Future    Attention deficit hyperactivity disorder (ADHD), predominantly inattentive type  Stable on present treatment.  Continue to monitor.  Recheck 6 months breast started       Screening for cardiovascular, respiratory, and genitourinary diseases    Orders:    CBC and differential; Future    Comprehensive metabolic panel; Future    Lipid panel; Future    Immunizations and preventive care screenings were discussed with patient today. Appropriate education was printed on patient's after visit summary.    Counseling:  Exercise: the importance of regular exercise/physical activity was discussed. Recommend exercise 3-5 times per week for at least 30 minutes.          History of Present Illness     Adult Annual Physical:  Patient presents for annual physical.   -Patient doing well  -Patient states that mood is stable on duloxetine.  Patient also states that Concerta is working well for his concentration.  He denies any side effects..     Diet and Physical Activity:  - Diet/Nutrition: well balanced diet.  - Exercise: no formal exercise.    Depression Screening:    - PHQ-9 Score: 0    General Health:  - Sleep: sleeps well and 7-8 hours of sleep on average.  - Hearing: normal hearing bilateral ears.  - Vision: no vision problems.  - Dental: regular dental visits and brushes teeth twice daily.     Health:  - History of STDs: no.   - Urinary symptoms: none.     Advanced Care Planning:  - Has an advanced directive?: no    - Has a durable medical POA?: no    - ACP  document given to patient?: no      Review of Systems   Constitutional: Negative.    HENT: Negative.     Eyes: Negative.    Respiratory: Negative.     Cardiovascular: Negative.    Gastrointestinal: Negative.    Endocrine: Negative.    Genitourinary: Negative.    Musculoskeletal: Negative.    Skin: Negative.    Allergic/Immunologic: Negative.    Neurological: Negative.    Hematological: Negative.    Psychiatric/Behavioral: Negative.       Past Medical History   Past Medical History:   Diagnosis Date    Bilateral carpal tunnel syndrome     Last Assessed 12/23/2015     Past Surgical History:   Procedure Laterality Date    MD NEUROPLASTY &/TRANSPOS MEDIAN NRV CARPAL TUNNE Left 10/25/2016    Procedure: CARPAL TUNNEL RELEASE ;  Surgeon: Odin Georges DO;  Location[facility];  Service: Orthopedics    MD NEUROPLASTY &/TRANSPOS MEDIAN NRV CARPAL TUNNE Right 3/22/2016    Procedure: CARPAL TUNNEL RELEASE ;  Surgeon: Odin Georges DO;  Location[facility];  Service: Orthopedics    VASECTOMY      Vas Deferens     Family History   Problem Relation Age of Onset    Hypertension Father         Essential     Current Outpatient Medications on File Prior to Visit   Medication Sig Dispense Refill    DULoxetine (CYMBALTA) 60 mg delayed release capsule TAKE 1 CAPSULE BY MOUTH EVERY DAY 30 capsule 5    methylphenidate (CONCERTA) 54 MG ER tablet TAKE 1 TABLET BY MOUTH EVERY DAY, MAXIMUM DAILY DOSE OF 1 TABLET PER DAY 30 tablet 0    sildenafil (VIAGRA) 50 MG tablet TAKE 1 TABLET BY MOUTH AS NEEDED FOR ERECTILE DYSFUNCTION 10 tablet 1    valACYclovir (VALTREX) 1,000 mg tablet Take 1 tablet (1,000 mg total) by mouth 2 (two) times a day for 14 days 28 tablet 0     No current facility-administered medications on file prior to visit.   No Known Allergies   Current Outpatient Medications on File Prior to Visit   Medication Sig Dispense Refill    DULoxetine (CYMBALTA) 60 mg delayed release capsule TAKE 1 CAPSULE BY MOUTH EVERY DAY 30 capsule 5     "methylphenidate (CONCERTA) 54 MG ER tablet TAKE 1 TABLET BY MOUTH EVERY DAY, MAXIMUM DAILY DOSE OF 1 TABLET PER DAY 30 tablet 0    sildenafil (VIAGRA) 50 MG tablet TAKE 1 TABLET BY MOUTH AS NEEDED FOR ERECTILE DYSFUNCTION 10 tablet 1    valACYclovir (VALTREX) 1,000 mg tablet Take 1 tablet (1,000 mg total) by mouth 2 (two) times a day for 14 days 28 tablet 0     No current facility-administered medications on file prior to visit.      Social History     Tobacco Use    Smoking status: Former     Passive exposure: Past    Smokeless tobacco: Never   Vaping Use    Vaping status: Never Used   Substance and Sexual Activity    Alcohol use: Yes     Alcohol/week: 3.0 standard drinks of alcohol     Types: 3 Cans of beer per week     Comment: every other day; Social    Drug use: No    Sexual activity: Yes       Objective     /78   Pulse 83   Temp 97.5 °F (36.4 °C)   Ht 6' 1\" (1.854 m)   Wt 96.9 kg (213 lb 9.6 oz)   SpO2 98%   BMI 28.18 kg/m²     Physical Exam  Vitals reviewed.   Constitutional:       Appearance: He is well-developed.   HENT:      Head: Normocephalic and atraumatic.      Right Ear: Tympanic membrane, ear canal and external ear normal.      Left Ear: Tympanic membrane, ear canal and external ear normal.      Nose: Nose normal.      Mouth/Throat:      Mouth: Mucous membranes are moist.   Eyes:      Extraocular Movements: Extraocular movements intact.      Conjunctiva/sclera: Conjunctivae normal.      Pupils: Pupils are equal, round, and reactive to light.   Neck:      Thyroid: No thyromegaly.      Vascular: No JVD.   Cardiovascular:      Rate and Rhythm: Normal rate and regular rhythm.      Pulses: Normal pulses.      Heart sounds: Normal heart sounds. No murmur heard.  Pulmonary:      Effort: Pulmonary effort is normal. No respiratory distress.      Breath sounds: Normal breath sounds. No wheezing or rales.   Abdominal:      General: Bowel sounds are normal. There is no distension.      Palpations: " Abdomen is soft. There is no mass.      Tenderness: There is no abdominal tenderness.   Musculoskeletal:         General: No swelling, tenderness or deformity. Normal range of motion.      Cervical back: Normal range of motion and neck supple. No tenderness. No muscular tenderness.      Right lower leg: No edema.      Left lower leg: No edema.   Lymphadenopathy:      Cervical: No cervical adenopathy.   Skin:     General: Skin is warm.      Capillary Refill: Capillary refill takes less than 2 seconds.   Neurological:      Mental Status: He is alert and oriented to person, place, and time.      Cranial Nerves: No cranial nerve deficit.      Sensory: No sensory deficit.      Motor: No weakness or abnormal muscle tone.      Coordination: Coordination normal.      Gait: Gait normal.      Deep Tendon Reflexes: Reflexes normal.   Psychiatric:         Mood and Affect: Mood normal.         Behavior: Behavior normal.         Thought Content: Thought content normal.         Judgment: Judgment normal.      Comments: PHQ-2/9 Depression Screening    Little interest or pleasure in doing things: 0 - not at all  Feeling down, depressed, or hopeless: 0 - not at all  Trouble falling or staying asleep, or sleeping too much: 0 - not at all  Feeling tired or having little energy: 0 - not at all  Poor appetite or overeatin - not at all  Feeling bad about yourself - or that you are a failure or have let   yourself or your family down: 0 - not at all  Trouble concentrating on things, such as reading the newspaper or watching   television: 0 - not at all  Moving or speaking so slowly that other people could have noticed. Or the   opposite - being so fidgety or restless that you have been moving around a   lot more than usual: 0 - not at all  Thoughts that you would be better off dead, or of hurting yourself in some   way: 0 - not at all  PHQ-9 Score: 0  PHQ-9 Interpretation: No or Minimal depression

## 2024-09-20 NOTE — PATIENT INSTRUCTIONS
"Patient Education     Routine physical for adults   The Basics   Written by the doctors and editors at [facility]   What is a physical? -- A physical is a routine visit, or \"check-up,\" with your doctor. You might also hear it called a \"wellness visit\" or \"preventive visit.\"  During each visit, the doctor will:   Ask about your physical and mental health   Ask about your habits, behaviors, and lifestyle   Do an exam   Give you vaccines if needed   Talk to you about any medicines you take   Give advice about your health   Answer your questions  Getting regular check-ups is an important part of taking care of your health. It can help your doctor find and treat any problems you have. But it's also important for preventing health problems.  A routine physical is different from a \"sick visit.\" A sick visit is when you see a doctor because of a health concern or problem. Since physicals are scheduled ahead of time, you can think about what you want to ask the doctor.  How often should I get a physical? -- It depends on your age and health. In general, for people age 21 years and older:   If you are younger than 50 years, you might be able to get a physical every 3 years.   If you are 50 years or older, your doctor might recommend a physical every year.  If you have an ongoing health condition, like diabetes or high blood pressure, your doctor will probably want to see you more often.  What happens during a physical? -- In general, each visit will include:   Physical exam - The doctor or nurse will check your height, weight, heart rate, and blood pressure. They will also look at your eyes and ears. They will ask about how you are feeling and whether you have any symptoms that bother you.   Medicines - It's a good idea to bring a list of all the medicines you take to each doctor visit. Your doctor will talk to you about your medicines and answer any questions. Tell them if you are having any side effects that bother you. You " "should also tell them if you are having trouble paying for any of your medicines.   Habits and behaviors - This includes:   Your diet   Your exercise habits   Whether you smoke, drink alcohol, or use drugs   Whether you are sexually active   Whether you feel safe at home  Your doctor will talk to you about things you can do to improve your health and lower your risk of health problems. They will also offer help and support. For example, if you want to quit smoking, they can give you advice and might prescribe medicines. If you want to improve your diet or get more physical activity, they can help you with this, too.   Lab tests, if needed - The tests you get will depend on your age and situation. For example, your doctor might want to check your:   Cholesterol   Blood sugar   Iron level   Vaccines - The recommended vaccines will depend on your age, health, and what vaccines you already had. Vaccines are very important because they can prevent certain serious or deadly infections.   Discussion of screening - \"Screening\" means checking for diseases or other health problems before they cause symptoms. Your doctor can recommend screening based on your age, risk, and preferences. This might include tests to check for:   Cancer, such as breast, prostate, cervical, ovarian, colorectal, prostate, lung, or skin cancer   Sexually transmitted infections, such as chlamydia and gonorrhea   Mental health conditions like depression and anxiety  Your doctor will talk to you about the different types of screening tests. They can help you decide which screenings to have. They can also explain what the results might mean.   Answering questions - The physical is a good time to ask the doctor or nurse questions about your health. If needed, they can refer you to other doctors or specialists, too.  Adults older than 65 years often need other care, too. As you get older, your doctor will talk to you about:   How to prevent falling at " home   Hearing or vision tests   Memory testing   How to take your medicines safely   Making sure that you have the help and support you need at home  All topics are updated as new evidence becomes available and our peer review process is complete.  This topic retrieved from [employer] on: May 02, 2024.  Topic 771162 Version 1.0  Release: 32.4.3 - C32.122  © 2024 UpToDate, Inc. and/or its affiliates. All rights reserved.  Consumer Information Use and Disclaimer   Disclaimer: This generalized information is a limited summary of diagnosis, treatment, and/or medication information. It is not meant to be comprehensive and should be used as a tool to help the user understand and/or assess potential diagnostic and treatment options. It does NOT include all information about conditions, treatments, medications, side effects, or risks that may apply to a specific patient. It is not intended to be medical advice or a substitute for the medical advice, diagnosis, or treatment of a health care provider based on the health care provider's examination and assessment of a patient's specific and unique circumstances. Patients must speak with a health care provider for complete information about their health, medical questions, and treatment options, including any risks or benefits regarding use of medications. This information does not endorse any treatments or medications as safe, effective, or approved for treating a specific patient. UpToDate, Inc. and its affiliates disclaim any warranty or liability relating to this information or the use thereof.The use of this information is governed by the Terms of Use, available at https://www.woltersPoint Insideuwer.com/en/know/clinical-effectiveness-terms. 2024© UpToDate, Inc. and its affiliates and/or licensors. All rights reserved.  Copyright   © 2024 UpToDate, Inc. and/or its affiliates. All rights reserved.

## 2024-09-25 NOTE — ASSESSMENT & PLAN NOTE
Stable on duloxetine.  Present continue present care.  Recheck 6 months  Orders:    TSH, 3rd generation with Free T4 reflex; Future

## 2024-10-29 DIAGNOSIS — F90.0 ATTENTION DEFICIT HYPERACTIVITY DISORDER (ADHD), PREDOMINANTLY INATTENTIVE TYPE: ICD-10-CM

## 2024-10-29 RX ORDER — METHYLPHENIDATE HYDROCHLORIDE 54 MG/1
TABLET ORAL
Qty: 30 TABLET | Refills: 0 | Status: SHIPPED | OUTPATIENT
Start: 2024-10-29

## 2024-10-29 NOTE — TELEPHONE ENCOUNTER
1 [id number] 09/13/2024 09/13/2024 Methylphenidate Hcl (Tablet, Extended Release) 30.0 30 54 MG NA JESSICA RAMIREZ POGODZINSKI [employer]. Commercial Insurance 0 / 0 [place]    1 [id number] 08/12/2024 08/12/2024 Methylphenidate Hcl (Tablet, Extended Release) 30.0 30 54 MG NA JESSICA RAMIREZ STAS [employer]. Commercial Insurance 0 / 0 PA    1 [id number] 07/03/2024 07/03/2024 Methylphenidate Hcl (Tablet, Extended Release) 30.0 30 54 MG NA JESSICA RAMIREZ POGODZINSKI [employer]. Commercial Insurance 0 / 0 PA    1 [id number] 05/31/2024 05/31/2024 Methylphenidate Hcl (Tablet, Extended Release) 30.0 30 54 MG NA JESSICA RAMIREZ STAS [employer]. Commercial Insurance 0 / 0 PA    1 [id number] 04/15/2024 04/15/2024 Methylphenidate Hcl (Tablet, Extended Release) 30.0 30 54 MG NA JESSICA RAMIREZ POGODZINSKI [employer]. Commercial Insurance 0 / 0 PA    1 [id number] 02/27/2024 02/26/2024 Methylphenidate Hcl (Tablet, Extended Release) 30.0 30 54 MG NA RAYNA MANUEL [employer] Commercial Insurance 0 / 0 PA    1 [id number] 01/26/2024 01/24/2024 Methylphenidate Hcl (Tablet, Extended Release) 30.0 30 54 MG NA JESSICA RAMIREZ [employer]AS [employer]. Commercial Insurance 0 / 0 PA    1 [id number] 12/21/2023 12/21/2023 Methylphenidate Hcl (Tablet, Extended Release) 30.0 30 54 MG NA JESSICA RAMIREZ STAS [employer]. Commercial Insurance 0 / 0 PA    1 [id number] 11/22/2023 11/22/2023 Methylphenidate Hcl (Tablet, Extended Release) 30.0 30 54 MG NA JESSICA RAMIREZ POGODZINSKI [facility] [employer] Commercial Insurance 0 / 0

## 2024-12-08 DIAGNOSIS — F90.0 ATTENTION DEFICIT HYPERACTIVITY DISORDER (ADHD), PREDOMINANTLY INATTENTIVE TYPE: ICD-10-CM

## 2024-12-10 NOTE — TELEPHONE ENCOUNTER
1 [id number] 10/30/2024 10/29/2024 Methylphenidate Hcl (Tablet, Extended Release) 30.0 30 54 MG NA JESSICA RAMIREZ POGODZINSKI [employer] Commercial Insurance 0 / 0 PA    1 [id number] 09/13/2024 09/13/2024 Methylphenidate Hcl (Tablet, Extended Release) 30.0 30 54 MG NA JESSICA RAMIREZ POGODZINSKI [employer] Commercial Insurance 0 / 0 PA    1 [id number] 08/12/2024 08/12/2024 Methylphenidate Hcl (Tablet, Extended Release) 30.0 30 54 MG NA JESSICA RAMIREZ POGODZINSKI [employer] Commercial Insurance 0 / 0 PA    1 [id number] 07/03/2024 07/03/2024 Methylphenidate Hcl (Tablet, Extended Release) 30.0 30 54 MG NA JESSICA RAMIREZ POGODZINSKI [employer] Commercial Insurance 0 / 0 PA    1 [id number] 05/31/2024 05/31/2024 Methylphenidate Hcl (Tablet, Extended Release) 30.0 30 54 MG NA JESSICA RAMIREZ POGODZINSKI [employer] Commercial Insurance 0 / 0 PA    1 [id number] 04/15/2024 04/15/2024 Methylphenidate Hcl (Tablet, Extended Release) 30.0 30 54 MG NA JESSICA RAMIREZ POGODZINSKI [facility] [employer]. Commercial Insurance 0 / 0 PA    1 [id number] 02/27/2024 02/26/2024 Methylphenidate Hcl (Tablet, Extended Release) 30.0 30 54 MG NA RAYNA MANUEL [employer] Commercial Insurance 0 / 0 PA    1 [id number] 01/26/2024 01/24/2024 Methylphenidate Hcl (Tablet, Extended Release) 30.0 30 54 MG NA JESSICA RAMIREZ POGODZINSKI [employer] Commercial Insurance 0 / 0 PA    1 [id number] 12/21/2023 12/21/2023 Methylphenidate Hcl (Tablet, Extended Release) 30.0 30 54 MG NA JESSICA RAMIREZ POGODZINSKI [facility] [employer] Commercial Insurance 0 / 0 PA

## 2024-12-11 RX ORDER — METHYLPHENIDATE HYDROCHLORIDE 54 MG/1
TABLET ORAL
Qty: 30 TABLET | Refills: 0 | Status: SHIPPED | OUTPATIENT
Start: 2024-12-11

## 2025-01-13 DIAGNOSIS — F90.0 ATTENTION DEFICIT HYPERACTIVITY DISORDER (ADHD), PREDOMINANTLY INATTENTIVE TYPE: ICD-10-CM

## 2025-01-13 NOTE — TELEPHONE ENCOUNTER
1 [id number] 12/11/2024 12/11/2024 Methylphenidate Hcl (Tablet, Extended Release) 30.0 30 54 MG NA JESSICA RAMIREZ POGODZINSKI [employer]. Commercial Insurance 0 / 0 PA    1 [id number] 10/30/2024 10/29/2024 Methylphenidate Hcl (Tablet, Extended Release) 30.0 30 54 MG NA JESSICA RAMIREZ POGODZINSKI [employer]. Commercial Insurance 0 / 0 PA    1 [id number] 09/13/2024 09/13/2024 Methylphenidate Hcl (Tablet, Extended Release) 30.0 30 54 MG NA JESSICA RAMIREZ POGODZINSKI [employer]. Commercial Insurance 0 / 0 PA    1 [id number] 08/12/2024 08/12/2024 Methylphenidate Hcl (Tablet, Extended Release) 30.0 30 54 MG NA JESSICA RAMIREZ POGODZINSKI [employer] Commercial Insurance 0 / 0 PA    1 [id number] 07/03/2024 07/03/2024 Methylphenidate Hcl (Tablet, Extended Release) 30.0 30 54 MG NA JESSICA RAMIREZ POGODZINSKI [employer]. Commercial Insurance 0 / 0 PA    1 [id number] 05/31/2024 05/31/2024 Methylphenidate Hcl (Tablet, Extended Release) 30.0 30 54 MG JESSICA WALLER POGODZINSKI [employer]. Commercial Insurance 0 / 0 PA    1 [id number] 04/15/2024 04/15/2024 Methylphenidate Hcl (Tablet, Extended Release) 30.0 30 54 MG NA JESSICA RAMIREZ POGODZINSKI [employer]. Commercial Insurance 0 / 0 PA    1 [id number] 02/27/2024 02/26/2024 Methylphenidate Hcl (Tablet, Extended Release) 30.0 30 54 MG NA RAYNA MANUEL [employer] Commercial Insurance 0 / 0 PA    1 [id number] 01/26/2024 01/24/2024 Methylphenidate Hcl (Tablet, Extended Release) 30.0 30 54 MG NA JESSICA RAMIREZ POGODZINSKI [facility] [employer] Commercial Insurance 0 / 0 PA

## 2025-01-14 DIAGNOSIS — N52.2 DRUG-INDUCED ERECTILE DYSFUNCTION: ICD-10-CM

## 2025-01-14 RX ORDER — SILDENAFIL 50 MG/1
50 TABLET, FILM COATED ORAL DAILY PRN
Qty: 10 TABLET | Refills: 0 | Status: SHIPPED | OUTPATIENT
Start: 2025-01-14

## 2025-01-14 RX ORDER — METHYLPHENIDATE HYDROCHLORIDE 54 MG/1
TABLET ORAL
Qty: 30 TABLET | Refills: 0 | Status: SHIPPED | OUTPATIENT
Start: 2025-01-14

## 2025-02-11 DIAGNOSIS — N52.2 DRUG-INDUCED ERECTILE DYSFUNCTION: ICD-10-CM

## 2025-02-11 DIAGNOSIS — F90.0 ATTENTION DEFICIT HYPERACTIVITY DISORDER (ADHD), PREDOMINANTLY INATTENTIVE TYPE: ICD-10-CM

## 2025-02-11 RX ORDER — SILDENAFIL 50 MG/1
TABLET, FILM COATED ORAL
Qty: 10 TABLET | Refills: 0 | Status: SHIPPED | OUTPATIENT
Start: 2025-02-11

## 2025-02-11 NOTE — TELEPHONE ENCOUNTER
1 [id number] 01/14/2025 01/14/2025 Methylphenidate Hcl (Tablet, Extended Release) 20.0 20 54 MG NA [facility] [employer] [employer]. Commercial Insurance 0 / 0 PA    1 [id number] 01/14/2025 01/14/2025 Methylphenidate Hcl (Tablet, Extended Release) 10.0 10 54 MG NA JESSICA RAMIREZ [facility] [employer], [employer]. Commercial Insurance 0 / 0 PA    1 [id number] 12/11/2024 12/11/2024 Methylphenidate Hcl (Tablet, Extended Release) 30.0 30 54 MG NA JESSICA RAMIREZ [facility] [employer], [employer]. Commercial Insurance 0 / 0 PA    1 [id number] 10/30/2024 10/29/2024 Methylphenidate Hcl (Tablet, Extended Release) 30.0 30 54 MG NA JESSICA RAMIREZ [facility] [employer], [employer]. Commercial Insurance 0 / 0 PA    1 [id number] 09/13/2024 09/13/2024 Methylphenidate Hcl (Tablet, Extended Release) 30.0 30 54 MG NA JESSICA RAMIREZ [facility] [employer], [employer]. Commercial Insurance 0 / 0 PA    1 [id number] 08/12/2024 08/12/2024 Methylphenidate Hcl (Tablet, Extended Release) 30.0 30 54 MG NA JESSICA RAMIREZ [facility] [employer], [employer]. Commercial Insurance 0 / 0 PA    1 [id number] 07/03/2024 07/03/2024 Methylphenidate Hcl (Tablet, Extended Release) 30.0 30 54 MG NA JESSICA RAMIREZ [facility] [employer], [employer]. Commercial Insurance 0 / 0 PA    1 [id number] 05/31/2024 05/31/2024 Methylphenidate Hcl (Tablet, Extended Release) 30.0 30 54 MG NA [facility] [employer], [employer]. Commercial Insurance 0 / 0 PA    1 [id number] 04/15/2024 04/15/2024 Methylphenidate Hcl (Tablet, Extended Release) 30.0 30 54 MG NA JESSICA RAMIREZ P[employer]INS[employer], [employer]. Commercial Insurance 0 / 0 PA    1 [id number] 02/27/2024 02/26/2024 Methylphenidate Hcl (Tablet, Extended Release) 30.0 30 54 MG NA RAYNA MANUEL [employer], [employer]. Commercial Insurance 0 / 0 PA

## 2025-02-12 RX ORDER — METHYLPHENIDATE HYDROCHLORIDE 54 MG/1
TABLET ORAL
Qty: 30 TABLET | Refills: 0 | Status: SHIPPED | OUTPATIENT
Start: 2025-02-12

## 2025-03-08 DIAGNOSIS — F33.0 MILD EPISODE OF RECURRENT MAJOR DEPRESSIVE DISORDER (HCC): ICD-10-CM

## 2025-03-08 RX ORDER — DULOXETIN HYDROCHLORIDE 60 MG/1
60 CAPSULE, DELAYED RELEASE ORAL DAILY
Qty: 30 CAPSULE | Refills: 5 | Status: SHIPPED | OUTPATIENT
Start: 2025-03-08

## 2025-03-24 DIAGNOSIS — F90.0 ATTENTION DEFICIT HYPERACTIVITY DISORDER (ADHD), PREDOMINANTLY INATTENTIVE TYPE: ICD-10-CM

## 2025-03-24 DIAGNOSIS — N52.2 DRUG-INDUCED ERECTILE DYSFUNCTION: ICD-10-CM

## 2025-03-25 RX ORDER — SILDENAFIL 50 MG/1
TABLET, FILM COATED ORAL
Qty: 10 TABLET | Refills: 0 | Status: SHIPPED | OUTPATIENT
Start: 2025-03-25

## 2025-03-25 RX ORDER — METHYLPHENIDATE HYDROCHLORIDE 54 MG/1
TABLET ORAL
Qty: 30 TABLET | Refills: 0 | Status: SHIPPED | OUTPATIENT
Start: 2025-03-25

## 2025-03-25 NOTE — TELEPHONE ENCOUNTER
1 [id number] 02/12/2025 02/12/2025 Methylphenidate Hcl (Tablet, Extended Release) 30.0 30 54 MG NA [facility] [employer] [employer]. Commercial Insurance 0 / 0 PA    1 [id number] 01/14/2025 01/14/2025 Methylphenidate Hcl (Tablet, Extended Release) 20.0 20 54 MG NA JESSICA RAMIREZ [facility] [employer], [employer]. Commercial Insurance 0 / 0 PA    1 [id number] 01/14/2025 01/14/2025 Methylphenidate Hcl (Tablet, Extended Release) 10.0 10 54 MG NA [facility]ESSICA RAMIRE[facility] [employer]. Commercial Insurance 0 / 0 PA    1 [id number] 12/11/2024 12/11/2024 Methylphenidate Hcl (Tablet, Extended Release) 30.0 30 54 MG NA JESSICA RAMIREZ [facility] [employer], [employer]. Commercial Insurance 0 / 0 PA    1 [id number] 10/30/2024 10/29/2024 Methylphenidate Hcl (Tablet, Extended Release) 30.0 30 54 MG NA [facility]ESSICA RAMIREZ [facility] [employer], [employer]. Commercial Insurance 0 / 0 PA    1 [id number] 09/13/2024 09/13/2024 Methylphenidate Hcl (Tablet, Extended Release) 30.0 30 54 MG NA JESSICA RAMIREZ [facility] [employer]. Commercial Insurance 0 / 0 PA    1 [id number] 08/12/2024 08/12/2024 Methylphenidate Hcl (Tablet, Extended Release) 30.0 30 54 MG NA EJSSICA RAMIREZ [facility] [employer], [employer]. Commercial Insurance 0 / 0 PA    1 [id number] 07/03/2024 07/03/2024 Methylphenidate Hcl (Tablet, Extended Release) 30.0 30 54 MG NA [facility] [employer], [employer]. Commercial Insurance 0 / 0 PA    1 [id number] 05/31/2024 05/31/2024 Methylphenidate Hcl (Tablet, Extended Release) 30.0 30 54 MG NA JESSICA RAMIREZ POGODZINSKI [employer] Commercial Insurance 0 / 0 PA    1 [id number] 04/15/2024 04/15/2024 Methylphenidate Hcl (Tablet, Extended Release) 30.0 30 54 MG NA JESSICA RAMIREZ, ALEXANDRA [employer] Commercial Insurance 0 / 0 PA

## 2025-04-06 DIAGNOSIS — B00.9 HSV (HERPES SIMPLEX VIRUS) INFECTION: ICD-10-CM

## 2025-04-08 RX ORDER — VALACYCLOVIR HYDROCHLORIDE 1 G/1
1000 TABLET, FILM COATED ORAL 2 TIMES DAILY
Qty: 28 TABLET | Refills: 0 | Status: SHIPPED | OUTPATIENT
Start: 2025-04-08 | End: 2025-04-22

## 2025-04-08 NOTE — TELEPHONE ENCOUNTER
Patient called to request a refill for their Valtrex 1,000mg advised a refill was requested on 4/6/25 and is pending approval. Patient verbalized understanding and is in agreement.     Does the patient have enough for 3 days?   [] Yes   [x] No - Send as HP to POD

## 2025-05-01 DIAGNOSIS — F90.0 ATTENTION DEFICIT HYPERACTIVITY DISORDER (ADHD), PREDOMINANTLY INATTENTIVE TYPE: ICD-10-CM

## 2025-05-01 DIAGNOSIS — B00.9 HSV (HERPES SIMPLEX VIRUS) INFECTION: ICD-10-CM

## 2025-05-01 NOTE — TELEPHONE ENCOUNTER
1 [id number] 03/25/2025 03/25/2025 Methylphenidate Hcl (Tablet, Extended Release) 30.0 30 54 MG NA RAYNA MANUEL [employer], [employer]. Commercial Insurance 0 / 0 [place]    1 [id number] 02/12/2025 02/12/2025 Methylphenidate Hcl (Tablet, Extended Release) 30.0 30 54 MG NA [facility] [employer] [employer]. Commercial Insurance 0 / 0 [place]    1 [id number] 01/14/2025 01/14/2025 Methylphenidate Hcl (Tablet, Extended Release) 20.0 20 54 MG NA JESSICA RAMIREZ KETTY [employer], [employer]. Commercial Insurance 0 / 0 PA    1 [id number] 01/14/2025 01/14/2025 Methylphenidate Hcl (Tablet, Extended Release) 10.0 10 54 MG NA JESSICA RAMIREZ KETTY [employer], [employer]. Commercial Insurance 0 / 0 PA    1 [id number] 12/11/2024 12/11/2024 Methylphenidate Hcl (Tablet, Extended Release) 30.0 30 54 MG NA JESSICA RAMIREZ POGODZINSKI [employer], [employer]. Commercial Insurance 0 / 0 PA    1 [id number] 10/30/2024 10/29/2024 Methylphenidate Hcl (Tablet, Extended Release) 30.0 30 54 MG NA JESSICA RAMIREZ POGODZINSKI [employer] Commercial Insurance 0 / 0 PA    1 [id number] 09/13/2024 09/13/2024 Methylphenidate Hcl (Tablet, Extended Release) 30.0 30 54 MG NA JESSICA RAMIREZ POGODZINSKI [employer] Commercial Insurance 0 / 0 PA    1 [id number] 08/12/2024 08/12/2024 Methylphenidate Hcl (Tablet, Extended Release) 30.0 30 54 MG NA JESSICA RAMIREZ POGODZINSKI [employer] Commercial Insurance 0 / 0 PA    1 [id number] 07/03/2024 07/03/2024 Methylphenidate Hcl (Tablet, Extended Release) 30.0 30 54 MG NA JESSICA RAMIREZ POGODZINSKI [employer] Commercial Insurance 0 / 0 PA    1 [id number] 05/31/2024 05/31/2024 Methylphenidate Hcl (Tablet, Extended Release) 30.0 30 54 MG NA JESSICA RAMIREZ, ALEXANDRA [employer] Commercial Insurance 0 / 0 PA

## 2025-05-02 RX ORDER — VALACYCLOVIR HYDROCHLORIDE 1 G/1
1000 TABLET, FILM COATED ORAL 2 TIMES DAILY
Qty: 28 TABLET | Refills: 0 | Status: SHIPPED | OUTPATIENT
Start: 2025-05-02 | End: 2025-05-16

## 2025-05-02 RX ORDER — METHYLPHENIDATE HYDROCHLORIDE 54 MG/1
TABLET ORAL
Qty: 30 TABLET | Refills: 0 | Status: SHIPPED | OUTPATIENT
Start: 2025-05-02

## 2025-05-29 DIAGNOSIS — N52.2 DRUG-INDUCED ERECTILE DYSFUNCTION: ICD-10-CM

## 2025-05-30 RX ORDER — SILDENAFIL 50 MG/1
TABLET, FILM COATED ORAL
Qty: 10 TABLET | Refills: 0 | Status: SHIPPED | OUTPATIENT
Start: 2025-05-30

## 2025-06-03 DIAGNOSIS — F90.0 ATTENTION DEFICIT HYPERACTIVITY DISORDER (ADHD), PREDOMINANTLY INATTENTIVE TYPE: ICD-10-CM

## 2025-06-04 RX ORDER — METHYLPHENIDATE HYDROCHLORIDE 54 MG/1
TABLET ORAL
Qty: 30 TABLET | Refills: 0 | Status: SHIPPED | OUTPATIENT
Start: 2025-06-04

## 2025-06-04 NOTE — TELEPHONE ENCOUNTER
1 [id number] 05/06/2025 05/02/2025 05/02/2025 Methylphenidate Hcl (Tablet, Extended Release) 30.0 30 54 MG JESSICA WALLER POGODZINSKI [employer], [employer]. Commercial Insurance 0 / 0 PA    1 [id number] 03/26/2025 03/25/2025 03/25/2025 Methylphenidate Hcl (Tablet, Extended Release) 30.0 30 54 MG NA RAYNA MANUEL [employer], [employer]. Commercial Insurance 0 / 0 PA    1 [id number] 02/21/2025 02/12/2025 02/12/2025 Methylphenidate Hcl (Tablet, Extended Release) 30.0 30 54 MG JESSICA WALLER POGODZINSKI [employer], [employer]. Commercial Insurance 0 / 0 PA    1 [id number] 01/18/2025 01/14/2025 01/14/2025 Methylphenidate Hcl (Tablet, Extended Release) 20.0 20 54 MG JESSICA WALLER POGODZINSKI [employer], [employer]. Commercial Insurance 0 / 0 PA    1 [id number] 01/14/2025 01/14/2025 01/14/2025 Methylphenidate Hcl (Tablet, Extended Release) 10.0 10 54 MG JESSICA WALLER POGODZINSKI [employer], [facility]. Commercial Insurance 0 / 0 PA    1 [id number] 12/12/2024 12/11/2024 12/11/2024 Methylphenidate Hcl (Tablet, Extended Release) 30.0 30 54 MG JESSICA WALLER POGODZINSKI [employer], [facility]. Commercial Insurance 0 / 0 PA    1 [id number] 11/05/2024 10/30/2024 10/29/2024 Methylphenidate Hcl (Tablet, Extended Release) 30.0 30 54 MG JESSICA WALLER POGODZINSKI [employer], [employer]. Commercial Insurance 0 / 0 PA    1 [id number] 09/18/2024 09/13/2024 09/13/2024 Methylphenidate Hcl (Tablet, Extended Release) 30.0 30 54 MG JESSICA WALLER POGODZINSKI [employer], [employer]. Commercial Insurance 0 / 0 PA    1 [id number] 08/13/2024 08/12/2024 08/12/2024 Methylphenidate Hcl (Tablet, Extended Release) 30.0 30 54 MG NA JESSICA RAMIREZ, [employer]. Commercial Insurance 0 / 0 PA    1 [id number] 07/08/2024 07/03/2024 07/03/2024 Methylphenidate Hcl (Tablet, Extended Release) 30.0 30 54 MG NA JESSICA RAMIREZ, [employer], [employer]. Commercial Insurance 0 / 0 PA

## 2025-07-11 ENCOUNTER — APPOINTMENT (OUTPATIENT)
Dept: URGENT CARE | Facility: MEDICAL CENTER | Age: 45
End: 2025-07-11

## 2025-08-05 DIAGNOSIS — F90.0 ATTENTION DEFICIT HYPERACTIVITY DISORDER (ADHD), PREDOMINANTLY INATTENTIVE TYPE: ICD-10-CM

## 2025-08-05 DIAGNOSIS — N52.2 DRUG-INDUCED ERECTILE DYSFUNCTION: ICD-10-CM

## 2025-08-05 RX ORDER — METHYLPHENIDATE HYDROCHLORIDE 54 MG/1
TABLET ORAL
Qty: 30 TABLET | Refills: 0 | Status: SHIPPED | OUTPATIENT
Start: 2025-08-05

## 2025-08-07 RX ORDER — SILDENAFIL 50 MG/1
TABLET, FILM COATED ORAL
Qty: 10 TABLET | Refills: 0 | Status: SHIPPED | OUTPATIENT
Start: 2025-08-07